# Patient Record
Sex: FEMALE | Race: OTHER | NOT HISPANIC OR LATINO | URBAN - METROPOLITAN AREA
[De-identification: names, ages, dates, MRNs, and addresses within clinical notes are randomized per-mention and may not be internally consistent; named-entity substitution may affect disease eponyms.]

---

## 2024-09-01 ENCOUNTER — INPATIENT (INPATIENT)
Facility: HOSPITAL | Age: 54
LOS: 4 days | Discharge: HOME | DRG: 418 | End: 2024-09-06
Attending: HOSPITALIST | Admitting: STUDENT IN AN ORGANIZED HEALTH CARE EDUCATION/TRAINING PROGRAM
Payer: COMMERCIAL

## 2024-09-01 VITALS
HEART RATE: 70 BPM | SYSTOLIC BLOOD PRESSURE: 123 MMHG | RESPIRATION RATE: 19 BRPM | TEMPERATURE: 98 F | DIASTOLIC BLOOD PRESSURE: 85 MMHG | OXYGEN SATURATION: 99 %

## 2024-09-01 DIAGNOSIS — K80.50 CALCULUS OF BILE DUCT WITHOUT CHOLANGITIS OR CHOLECYSTITIS WITHOUT OBSTRUCTION: ICD-10-CM

## 2024-09-01 LAB
ALBUMIN SERPL ELPH-MCNC: 4.1 G/DL — SIGNIFICANT CHANGE UP (ref 3.3–5)
ALBUMIN SERPL ELPH-MCNC: 4.6 G/DL — SIGNIFICANT CHANGE UP (ref 3.3–5)
ALP SERPL-CCNC: 159 U/L — HIGH (ref 40–120)
ALP SERPL-CCNC: 165 U/L — HIGH (ref 40–120)
ALT FLD-CCNC: 116 U/L — HIGH (ref 10–45)
ALT FLD-CCNC: 230 U/L — HIGH (ref 10–45)
ANION GAP SERPL CALC-SCNC: 15 MMOL/L — SIGNIFICANT CHANGE UP (ref 5–17)
ANION GAP SERPL CALC-SCNC: 15 MMOL/L — SIGNIFICANT CHANGE UP (ref 5–17)
ANISOCYTOSIS BLD QL: SIGNIFICANT CHANGE UP
APPEARANCE UR: CLEAR — SIGNIFICANT CHANGE UP
AST SERPL-CCNC: 238 U/L — HIGH (ref 10–40)
AST SERPL-CCNC: 435 U/L — HIGH (ref 10–40)
BACTERIA # UR AUTO: ABNORMAL /HPF
BASE EXCESS BLDV CALC-SCNC: -1.3 MMOL/L — SIGNIFICANT CHANGE UP (ref -2–3)
BASOPHILS # BLD AUTO: 0 K/UL — SIGNIFICANT CHANGE UP (ref 0–0.2)
BASOPHILS NFR BLD AUTO: 0 % — SIGNIFICANT CHANGE UP (ref 0–2)
BILIRUB SERPL-MCNC: 1.6 MG/DL — HIGH (ref 0.2–1.2)
BILIRUB SERPL-MCNC: 1.7 MG/DL — HIGH (ref 0.2–1.2)
BILIRUB UR-MCNC: NEGATIVE — SIGNIFICANT CHANGE UP
BUN SERPL-MCNC: 18 MG/DL — SIGNIFICANT CHANGE UP (ref 7–23)
BUN SERPL-MCNC: 20 MG/DL — SIGNIFICANT CHANGE UP (ref 7–23)
CALCIUM SERPL-MCNC: 10.1 MG/DL — SIGNIFICANT CHANGE UP (ref 8.4–10.5)
CALCIUM SERPL-MCNC: 9.6 MG/DL — SIGNIFICANT CHANGE UP (ref 8.4–10.5)
CAST: 4 /LPF — SIGNIFICANT CHANGE UP (ref 0–4)
CHLORIDE SERPL-SCNC: 100 MMOL/L — SIGNIFICANT CHANGE UP (ref 96–108)
CHLORIDE SERPL-SCNC: 102 MMOL/L — SIGNIFICANT CHANGE UP (ref 96–108)
CO2 BLDV-SCNC: 26 MMOL/L — SIGNIFICANT CHANGE UP (ref 22–26)
CO2 SERPL-SCNC: 17 MMOL/L — LOW (ref 22–31)
CO2 SERPL-SCNC: 22 MMOL/L — SIGNIFICANT CHANGE UP (ref 22–31)
COLOR SPEC: YELLOW — SIGNIFICANT CHANGE UP
CREAT SERPL-MCNC: 0.58 MG/DL — SIGNIFICANT CHANGE UP (ref 0.5–1.3)
CREAT SERPL-MCNC: 0.59 MG/DL — SIGNIFICANT CHANGE UP (ref 0.5–1.3)
DACRYOCYTES BLD QL SMEAR: SLIGHT — SIGNIFICANT CHANGE UP
DIFF PNL FLD: NEGATIVE — SIGNIFICANT CHANGE UP
EGFR: 108 ML/MIN/1.73M2 — SIGNIFICANT CHANGE UP
EGFR: 108 ML/MIN/1.73M2 — SIGNIFICANT CHANGE UP
EOSINOPHIL # BLD AUTO: 0.26 K/UL — SIGNIFICANT CHANGE UP (ref 0–0.5)
EOSINOPHIL NFR BLD AUTO: 1.8 % — SIGNIFICANT CHANGE UP (ref 0–6)
GAS PNL BLDV: SIGNIFICANT CHANGE UP
GAS PNL BLDV: SIGNIFICANT CHANGE UP
GLUCOSE SERPL-MCNC: 103 MG/DL — HIGH (ref 70–99)
GLUCOSE SERPL-MCNC: 114 MG/DL — HIGH (ref 70–99)
GLUCOSE UR QL: NEGATIVE MG/DL — SIGNIFICANT CHANGE UP
HCO3 BLDV-SCNC: 25 MMOL/L — SIGNIFICANT CHANGE UP (ref 22–29)
HCT VFR BLD CALC: 39.3 % — SIGNIFICANT CHANGE UP (ref 34.5–45)
HGB BLD-MCNC: 11.7 G/DL — SIGNIFICANT CHANGE UP (ref 11.5–15.5)
KETONES UR-MCNC: NEGATIVE MG/DL — SIGNIFICANT CHANGE UP
LEUKOCYTE ESTERASE UR-ACNC: ABNORMAL
LIDOCAIN IGE QN: 46 U/L — SIGNIFICANT CHANGE UP (ref 7–60)
LYMPHOCYTES # BLD AUTO: 1.16 K/UL — SIGNIFICANT CHANGE UP (ref 1–3.3)
LYMPHOCYTES # BLD AUTO: 7.9 % — LOW (ref 13–44)
MANUAL SMEAR VERIFICATION: SIGNIFICANT CHANGE UP
MCHC RBC-ENTMCNC: 18.8 PG — LOW (ref 27–34)
MCHC RBC-ENTMCNC: 29.8 GM/DL — LOW (ref 32–36)
MCV RBC AUTO: 63.3 FL — LOW (ref 80–100)
MICROCYTES BLD QL: SLIGHT — SIGNIFICANT CHANGE UP
MONOCYTES # BLD AUTO: 0.38 K/UL — SIGNIFICANT CHANGE UP (ref 0–0.9)
MONOCYTES NFR BLD AUTO: 2.6 % — SIGNIFICANT CHANGE UP (ref 2–14)
NEUTROPHILS # BLD AUTO: 12.85 K/UL — HIGH (ref 1.8–7.4)
NEUTROPHILS NFR BLD AUTO: 87.7 % — HIGH (ref 43–77)
NITRITE UR-MCNC: NEGATIVE — SIGNIFICANT CHANGE UP
OVALOCYTES BLD QL SMEAR: SLIGHT — SIGNIFICANT CHANGE UP
PCO2 BLDV: 46 MMHG — HIGH (ref 39–42)
PH BLDV: 7.34 — SIGNIFICANT CHANGE UP (ref 7.32–7.43)
PH UR: 5.5 — SIGNIFICANT CHANGE UP (ref 5–8)
PLAT MORPH BLD: NORMAL — SIGNIFICANT CHANGE UP
PLATELET # BLD AUTO: 295 K/UL — SIGNIFICANT CHANGE UP (ref 150–400)
PO2 BLDV: 30 MMHG — SIGNIFICANT CHANGE UP (ref 25–45)
POIKILOCYTOSIS BLD QL AUTO: SLIGHT — SIGNIFICANT CHANGE UP
POLYCHROMASIA BLD QL SMEAR: SLIGHT — SIGNIFICANT CHANGE UP
POTASSIUM SERPL-MCNC: 4.3 MMOL/L — SIGNIFICANT CHANGE UP (ref 3.5–5.3)
POTASSIUM SERPL-MCNC: 5.7 MMOL/L — HIGH (ref 3.5–5.3)
POTASSIUM SERPL-SCNC: 4.3 MMOL/L — SIGNIFICANT CHANGE UP (ref 3.5–5.3)
POTASSIUM SERPL-SCNC: 5.7 MMOL/L — HIGH (ref 3.5–5.3)
PROT SERPL-MCNC: 7.9 G/DL — SIGNIFICANT CHANGE UP (ref 6–8.3)
PROT SERPL-MCNC: 8.6 G/DL — HIGH (ref 6–8.3)
PROT UR-MCNC: NEGATIVE MG/DL — SIGNIFICANT CHANGE UP
RBC # BLD: 6.21 M/UL — HIGH (ref 3.8–5.2)
RBC # FLD: 17.3 % — HIGH (ref 10.3–14.5)
RBC BLD AUTO: ABNORMAL
RBC CASTS # UR COMP ASSIST: 1 /HPF — SIGNIFICANT CHANGE UP (ref 0–4)
SAO2 % BLDV: 47 % — LOW (ref 67–88)
SODIUM SERPL-SCNC: 132 MMOL/L — LOW (ref 135–145)
SODIUM SERPL-SCNC: 139 MMOL/L — SIGNIFICANT CHANGE UP (ref 135–145)
SP GR SPEC: 1.03 — SIGNIFICANT CHANGE UP (ref 1–1.03)
SQUAMOUS # UR AUTO: 8 /HPF — HIGH (ref 0–5)
TARGETS BLD QL SMEAR: SLIGHT — SIGNIFICANT CHANGE UP
TROPONIN T, HIGH SENSITIVITY RESULT: <6 NG/L — SIGNIFICANT CHANGE UP (ref 0–51)
UROBILINOGEN FLD QL: 1 MG/DL — SIGNIFICANT CHANGE UP (ref 0.2–1)
WBC # BLD: 14.65 K/UL — HIGH (ref 3.8–10.5)
WBC # FLD AUTO: 14.65 K/UL — HIGH (ref 3.8–10.5)
WBC UR QL: 12 /HPF — HIGH (ref 0–5)

## 2024-09-01 PROCEDURE — 99285 EMERGENCY DEPT VISIT HI MDM: CPT

## 2024-09-01 PROCEDURE — 74177 CT ABD & PELVIS W/CONTRAST: CPT | Mod: 26,MC

## 2024-09-01 PROCEDURE — 76705 ECHO EXAM OF ABDOMEN: CPT | Mod: 26

## 2024-09-01 RX ORDER — ONDANSETRON 2 MG/ML
8 INJECTION, SOLUTION INTRAMUSCULAR; INTRAVENOUS ONCE
Refills: 0 | Status: DISCONTINUED | OUTPATIENT
Start: 2024-09-01 | End: 2024-09-01

## 2024-09-01 RX ORDER — ONDANSETRON 2 MG/ML
4 INJECTION, SOLUTION INTRAMUSCULAR; INTRAVENOUS ONCE
Refills: 0 | Status: COMPLETED | OUTPATIENT
Start: 2024-09-01 | End: 2024-09-01

## 2024-09-01 RX ORDER — SODIUM CHLORIDE 9 MG/ML
1000 INJECTION INTRAMUSCULAR; INTRAVENOUS; SUBCUTANEOUS ONCE
Refills: 0 | Status: COMPLETED | OUTPATIENT
Start: 2024-09-01 | End: 2024-09-01

## 2024-09-01 RX ADMIN — SODIUM CHLORIDE 1000 MILLILITER(S): 9 INJECTION INTRAMUSCULAR; INTRAVENOUS; SUBCUTANEOUS at 21:30

## 2024-09-01 NOTE — ED PROVIDER NOTE - PHYSICAL EXAMINATION
Const: Awake, alert, no acute distress.  Well appearing.  Sitting comfortably on stretcher.  HEENT: NC/AT.  Moist mucous membranes.   Eyes: Conjunctiva clear. No scleral icterus.  Neck: Neck supple, ROM without pain.  Cardiac: Regular rate and regular rhythm. S1 S2 present.    Resp: Speaking in full sentences. No evidence of respiratory distress.  Good air entry b/l, breath sounds clear to auscultation b/l.  Abd: Non-distended, no overlying skin changes.  Soft, no guarding, no rigidity.  No palpable masses. + RUQ TTP, + epigastric TTP.   MSK: Spine midline and non-tender, no paraspinal tenderness, no palpable deformities. Evaluating R CVAT elicits RUQ abd pain. No L CVAT.   Skin: Normal coloration.  No rashes, abrasions or lacerations.  Neuro: Awake, alert & oriented x 3.  Moves all extremities spontaneously.

## 2024-09-01 NOTE — ED ADULT NURSE NOTE - OBJECTIVE STATEMENT
Pt is a 55 y/o F with PMH of breast CA presenting with 3 days of progressive BL U abdominal pain and diarrhea starting this morning. Pt endorses taking ibuprofen at 1700 today providing partial relief. Upon assessment pt is a/o x 3 speaking coherently in full sentences. Pt is breathing unlabored and equal BL in no apparent distress, radial pulses are 2+ BL, abdomen is soft, TTP, and nondistended, skin is warm and dry. Pt denies fevers/chills, headaches/vision changes, chest pain/SOB, n/v/d, or changes in urinary/defecation habits. Pt is in stretcher in lowest position with side rails up.

## 2024-09-01 NOTE — ED PROVIDER NOTE - OBJECTIVE STATEMENT
54-year-old female with PMHx of breast cancer dx 2018 s/p bilateral mastectomy presenting with 1 day of abdominal pain and diarrhea.  + nausea. Patient experienced abdominal pain a few days ago. resolved without intervention.  This morning patient woke up with diffuse abdominal pain, went to work after eating tea and eggs, did not eat again until 5 PM, pain became much worse after eating.  The only thing patient ate that her family did not eat yesterday was [employer], denies abdominal pain after that meal.  Patient had similar symptoms 4 months ago, also resolved without intervention.  Patient's last normal bowel movement was 2 days ago, has been passing gas. No hx of abd surgeries, still has gallbladder and appendix.  Hx of DVT that led to PE 4 months ago.  Denies fever, chest pain, shortness of breath, vomiting, constipation, dysuria, hematuria, hematochezia/melena. No one else experiencing similar symptoms at home. Pt returned from [place] 20 days ago, has been feeling ok since returning until recently.

## 2024-09-01 NOTE — ED PROVIDER NOTE - CLINICAL SUMMARY MEDICAL DECISION MAKING FREE TEXT BOX
Attending note.  Patient was seen in room #33 to the right.  Patient is was complaining some mild generalized abdominal pain that started 3 days ago and that resolved yesterday.  At 5 PM today when she was eating she had acute onset of epigastric and right upper quadrant pain with radiation to the back.  Patient received some ibuprofen and the pain is currently improved she had nausea and diaphoresis when pain was severe.  She denies any vomiting.  Patient had episodes sewed of diarrhea x 2 this morning which was nonbloody.  She is currently anorexic and has not eaten dinner.  She denies any respiratory or  symptoms.  She has no previous abdominal surgery.  She has a past medical history of breast cancer and was being treated with tamoxifen which resulted in a pulmonary embolus.  Social history is negative.  She has no allergies to medications.      ROS-as above, otherwise negative.  PE-patient is alert no acute distress.  There is no pallor or jaundice.  She has moist mucous membranes.  Lungs are clear and equal bilaterally.  Heart is regular rate and rhythm.  Abdomen is soft, nondistended.  There is tenderness in the epigastrium greater than the right upper quadrant.  With percussion to the right flank patient experiences pain in the right upper quadrant.  There is no guarding or rebound.  There is no extremity edema.  Neurologic examination is grossly intact.        A/P-patient with acute onset of epigastric and right upper quadrant pain with pain referred to the right upper quadrant when percussing in the flank.  Concern for biliary colic.  Differential also includes pancreatitis versus ulcer versus colitis.  Labs including lipase, hCG, urinalysis, right upper quadrant ultrasound, CT scan abdomen pelvis to rule out colitis.  IV hydration, Zofran.

## 2024-09-01 NOTE — ED PROVIDER NOTE - NSFOLLOWUPINSTRUCTIONS_ED_ALL_ED_FT
You were seen in the ED today for abdominal pain.  You had blood work, and ultrasound, and a CT scan done here today.  The results of these tests are attached within this packet, please bring them with you when you follow-up with your primary care physician in 2-3 days.    Many things can cause abdominal pain. Many times, abdominal pain is not caused by a disease and will improve without treatment. Your health care provider will do a physical exam to determine if there is a dangerous cause of your pain; blood tests and imaging may help determine the cause of your pain. However, in many cases, no cause may be found and you may need further testing as an outpatient. Monitor your abdominal pain for any changes.     SEEK IMMEDIATE MEDICAL CARE IF YOU HAVE ANY OF THE FOLLOWING SYMPTOMS: worsening abdominal pain, uncontrollable vomiting, profuse diarrhea, inability to have bowel movements or pass gas, black or bloody stools, fever accompanying chest pain or back pain, or fainting. These symptoms may represent a serious problem that is an emergency. Do not wait to see if the symptoms will go away. Get medical help right away. Call 911 and do not drive yourself to the hospital.

## 2024-09-01 NOTE — ED PROVIDER NOTE - PROGRESS NOTE DETAILS
US showing dilate CBD and elevated LFTs without fever.  choledocholithiasis - admit to medicine for MRCP/ERCP.

## 2024-09-02 DIAGNOSIS — Z85.3 PERSONAL HISTORY OF MALIGNANT NEOPLASM OF BREAST: ICD-10-CM

## 2024-09-02 DIAGNOSIS — K83.09 OTHER CHOLANGITIS: ICD-10-CM

## 2024-09-02 DIAGNOSIS — Z29.9 ENCOUNTER FOR PROPHYLACTIC MEASURES, UNSPECIFIED: ICD-10-CM

## 2024-09-02 DIAGNOSIS — Z90.10 ACQUIRED ABSENCE OF UNSPECIFIED BREAST AND NIPPLE: Chronic | ICD-10-CM

## 2024-09-02 DIAGNOSIS — Z90.11 ACQUIRED ABSENCE OF RIGHT BREAST AND NIPPLE: Chronic | ICD-10-CM

## 2024-09-02 PROCEDURE — 99222 1ST HOSP IP/OBS MODERATE 55: CPT | Mod: GC

## 2024-09-02 PROCEDURE — 99223 1ST HOSP IP/OBS HIGH 75: CPT | Mod: GC

## 2024-09-02 PROCEDURE — 74181 MRI ABDOMEN W/O CONTRAST: CPT | Mod: 26

## 2024-09-02 RX ORDER — PIPERACILLIN SODIUM AND TAZOBACTAM SODIUM 3; .375 G/15ML; G/15ML
3.38 INJECTION, POWDER, FOR SOLUTION INTRAVENOUS EVERY 8 HOURS
Refills: 0 | Status: DISCONTINUED | OUTPATIENT
Start: 2024-09-02 | End: 2024-09-05

## 2024-09-02 RX ORDER — IBUPROFEN 600 MG
400 TABLET ORAL ONCE
Refills: 0 | Status: COMPLETED | OUTPATIENT
Start: 2024-09-02 | End: 2024-09-02

## 2024-09-02 RX ORDER — EXEMESTANE 25 MG/1
25 TABLET, FILM COATED ORAL
Refills: 0 | Status: DISCONTINUED | OUTPATIENT
Start: 2024-09-02 | End: 2024-09-05

## 2024-09-02 RX ORDER — PIPERACILLIN SODIUM AND TAZOBACTAM SODIUM 3; .375 G/15ML; G/15ML
3.38 INJECTION, POWDER, FOR SOLUTION INTRAVENOUS ONCE
Refills: 0 | Status: COMPLETED | OUTPATIENT
Start: 2024-09-02 | End: 2024-09-02

## 2024-09-02 RX ORDER — MAGNESIUM, ALUMINUM HYDROXIDE 200-225/5
30 SUSPENSION, ORAL (FINAL DOSE FORM) ORAL EVERY 4 HOURS
Refills: 0 | Status: DISCONTINUED | OUTPATIENT
Start: 2024-09-02 | End: 2024-09-05

## 2024-09-02 RX ORDER — ONDANSETRON 2 MG/ML
4 INJECTION, SOLUTION INTRAMUSCULAR; INTRAVENOUS EVERY 8 HOURS
Refills: 0 | Status: DISCONTINUED | OUTPATIENT
Start: 2024-09-02 | End: 2024-09-05

## 2024-09-02 RX ORDER — EXEMESTANE 25 MG/1
0 TABLET, FILM COATED ORAL
Qty: 0 | Refills: 0 | DISCHARGE

## 2024-09-02 RX ADMIN — PIPERACILLIN SODIUM AND TAZOBACTAM SODIUM 25 GRAM(S): 3; .375 INJECTION, POWDER, FOR SOLUTION INTRAVENOUS at 21:59

## 2024-09-02 RX ADMIN — Medication 100 MILLILITER(S): at 06:00

## 2024-09-02 RX ADMIN — EXEMESTANE 25 MILLIGRAM(S): 25 TABLET, FILM COATED ORAL at 13:44

## 2024-09-02 RX ADMIN — PIPERACILLIN SODIUM AND TAZOBACTAM SODIUM 200 GRAM(S): 3; .375 INJECTION, POWDER, FOR SOLUTION INTRAVENOUS at 03:14

## 2024-09-02 RX ADMIN — PIPERACILLIN SODIUM AND TAZOBACTAM SODIUM 25 GRAM(S): 3; .375 INJECTION, POWDER, FOR SOLUTION INTRAVENOUS at 06:00

## 2024-09-02 RX ADMIN — Medication 400 MILLIGRAM(S): at 20:26

## 2024-09-02 RX ADMIN — Medication 400 MILLIGRAM(S): at 21:30

## 2024-09-02 RX ADMIN — PIPERACILLIN SODIUM AND TAZOBACTAM SODIUM 25 GRAM(S): 3; .375 INJECTION, POWDER, FOR SOLUTION INTRAVENOUS at 13:44

## 2024-09-02 NOTE — H&P ADULT - NSHPLABSRESULTS_GEN_ALL_CORE
11.7   14.65 )-----------( 295      ( 01 Sep 2024 20:21 )             39.3       09-01    132<L>  |  100  |  18  ----------------------------<  103<H>  4.3   |  17<L>  |  0.59    Ca    9.6      01 Sep 2024 21:29    TPro  7.9  /  Alb  4.1  /  TBili  1.7<H>  /  DBili  x   /  AST  435<H>  /  ALT  230<H>  /  AlkPhos  165<H>  09-01              Urinalysis Basic - ( 01 Sep 2024 21:29 )    Color: x / Appearance: x / SG: x / pH: x  Gluc: 103 mg/dL / Ketone: x  / Bili: x / Urobili: x   Blood: x / Protein: x / Nitrite: x   Leuk Esterase: x / RBC: x / WBC x   Sq Epi: x / Non Sq Epi: x / Bacteria: x                  CAPILLARY BLOOD GLUCOSE

## 2024-09-02 NOTE — H&P ADULT - NSHPPHYSICALEXAM_GEN_ALL_CORE
Vital Signs Last 24 Hrs  T(C): 36.6 (02 Sep 2024 00:13), Max: 36.7 (01 Sep 2024 22:56)  T(F): 97.9 (02 Sep 2024 00:13), Max: 98 (01 Sep 2024 22:56)  HR: 53 (02 Sep 2024 00:13) (53 - 70)  BP: 124/80 (02 Sep 2024 00:13) (113/75 - 130/84)  BP(mean): --  RR: 18 (02 Sep 2024 00:13) (18 - 20)  SpO2: 97% (02 Sep 2024 00:13) (97% - 100%)    Parameters below as of 02 Sep 2024 00:13  Patient On (Oxygen Delivery Method): room air        CONSTITUTIONAL: Well-groomed, in no apparent distress  EYES: No conjunctival or scleral injection, non-icteric  ENMT: No external nasal lesions; MMM  RESPIRATORY: Breathing comfortably; lungs CTA without wheeze/rhonchi/rales  CARDIOVASCULAR: +S1S2, RRR; no lower extremity edema  GASTROINTESTINAL: soft, TTP RUQ and epigastric, +BS throughout, no rebound/guarding  NEUROLOGIC: No gross focal neurological deficits, AAOX3  PSYCHIATRIC: mood and affect appropriate; appropriate insight and judgment

## 2024-09-02 NOTE — CHART NOTE - NSCHARTNOTEFT_GEN_A_CORE
H&P reviewed. On exam, pt's abdomen is soft, nontender, nondistended. Pain has improved since admission. GI recs appreciated.    - MRCP to r/o choledocholithiasis  - c/w zosyn for mild cholangitis  - daily CBC/CMP/INR

## 2024-09-02 NOTE — H&P ADULT - PROBLEM SELECTOR PLAN 1
-start zosyn  -GI consult emailed   -NPO except meds for now  -ordered for 1L LR @ 100cc/hr. Monitor resp status and re-order as needed.

## 2024-09-02 NOTE — CONSULT NOTE ADULT - ATTENDING COMMENTS
History/PE as above.  Patient seen/examined.  Assessment and recommendations as noted–admitted with episode of abdominal pain with clinical course noted for elevated liver enzymes and abnormal sonogram noted for gallstones and question as to choledocholithiasis.  Reports no current fever, nausea or vomiting.  Pain somewhat subsided.  Currently comfortable/NAD/nontoxic appearing.  Status post MRCP earlier today–await results.  Pending MRCP if choledocholithiasis confirmed then plan for ERCP.  Continue to monitor serial liver enzymes and in the event of any further increase in WBC or fevers obtain blood cultures.  Surgery consult regarding timing of laparoscopic cholecystectomy

## 2024-09-02 NOTE — H&P ADULT - HISTORY OF PRESENT ILLNESS
54F PMHx h/o breast ca s/p b/l mastectomy, recent DVT/PE. Presenting w/ abd pain, nausea and diarrhea x1d, worse with eating.   In the ED, VSS.   CBC w/ wbc 14, hgb 11.7, plt 295.   First CMP showed K 5.7, SCr 0.58, Tbili 1.6, alk phos 159, , , lipase 46.   Pt received 1L IVF.   Repeat CMP showed K 4.3, SCr 0.59, Tbili 1.7, alk phos 165, , .   VBG w/ lactate 2.0   UA with trace LE, 12 wbc, mod bacteria, but contaminated w/ 8 epith cells.   CT A/p neg for acute path, but US ruq showed gallstones and  8mm dilated CBD, but no GB wall thickening.  54F PMHx h/o breast ca s/p b/l mastectomy, recent DVT/PE. Presenting w/ abd pain, nausea and diarrhea x1d, worse with eating.   In the ED, VSS.   CBC w/ wbc 14, hgb 11.7, plt 295.   First CMP showed K 5.7, SCr 0.58, Tbili 1.6, alk phos 159, , , lipase 46.   Pt received 1L IVF.   Repeat CMP showed K 4.3, SCr 0.59, Tbili 1.7, alk phos 165, , .   VBG w/ lactate 2.0   UA with trace LE, 12 wbc, mod bacteria, but contaminated w/ 8 epith cells.   CT A/p neg for acute path, but US ruq showed gallstones and  8mm dilated CBD, but no GB wall thickening.   Pt meets criteria for cholangitis

## 2024-09-02 NOTE — CONSULT NOTE ADULT - SUBJECTIVE AND OBJECTIVE BOX
Initial GI Consult    HPI:  54F PMHx h/o breast ca s/p b/l mastectomy, recent DVT/PE who presents with abdominal pain.     PAST MEDICAL & SURGICAL HISTORY:  Breast cancer      H/O mastectomy        FAMILY HISTORY:      MEDS:  MEDICATIONS  (STANDING):  exemestane 25 milliGRAM(s) Oral <User Schedule>  piperacillin/tazobactam IVPB.. 3.375 Gram(s) IV Intermittent every 8 hours    MEDICATIONS  (PRN):  aluminum hydroxide/magnesium hydroxide/simethicone Suspension 30 milliLiter(s) Oral every 4 hours PRN Dyspepsia  melatonin 3 milliGRAM(s) Oral at bedtime PRN Insomnia  ondansetron Injectable 4 milliGRAM(s) IV Push every 8 hours PRN Nausea and/or Vomiting    Allergies    No Known Allergies    Intolerances          ROS: As per HPI  ______________________________________________________________________  PHYSICAL EXAM:  T(C): 36.4 (09-02-24 @ 05:14), Max: 36.7 (09-01-24 @ 22:56)  HR: 68 (09-02-24 @ 05:14)  BP: 125/83 (09-02-24 @ 05:14)  RR: 18 (09-02-24 @ 05:14)  SpO2: 95% (09-02-24 @ 05:14)  Wt(kg): --      GEN: NAD, normocephalic  CVS: S1S2+  CHEST: clear to auscultation  ABD: soft , nontender, nondistended, bowel sounds present  EXTR: no cyanosis, no clubbing, no edema  NEURO: A&OX3  SKIN:  warm;  non icteric    ______________________________________________________________________  LABS:                        11.7   14.65 )-----------( 295      ( 01 Sep 2024 20:21 )             39.3     09-01    132<L>  |  100  |  18  ----------------------------<  103<H>  4.3   |  17<L>  |  0.59    Ca    9.6      01 Sep 2024 21:29    TPro  7.9  /  Alb  4.1  /  TBili  1.7<H>  /  DBili  x   /  AST  435<H>  /  ALT  230<H>  /  AlkPhos  165<H>  09-01    LIVER FUNCTIONS - ( 01 Sep 2024 21:29 )  Alb: 4.1 g/dL / Pro: 7.9 g/dL / ALK PHOS: 165 U/L / ALT: 230 U/L / AST: 435 U/L / GGT: x             ____________________________________________         Initial GI Consult    HPI:  54F PMHx h/o breast ca s/p b/l mastectomy, recent DVT/PE who presents with abdominal pain. Patient reports that she passed out yesterday from abdominal pain. She felt the pain in her entire abdomen. She denies diarrhea, constipation, N/V. No alcohol use/ drug use. She has never been told that she has liver issues. No DM or HTN. No new meds or herbal supplements. No blood thinners.     PAST MEDICAL & SURGICAL HISTORY:  Breast cancer      H/O mastectomy        FAMILY HISTORY:      MEDS:  MEDICATIONS  (STANDING):  exemestane 25 milliGRAM(s) Oral <User Schedule>  piperacillin/tazobactam IVPB.. 3.375 Gram(s) IV Intermittent every 8 hours    MEDICATIONS  (PRN):  aluminum hydroxide/magnesium hydroxide/simethicone Suspension 30 milliLiter(s) Oral every 4 hours PRN Dyspepsia  melatonin 3 milliGRAM(s) Oral at bedtime PRN Insomnia  ondansetron Injectable 4 milliGRAM(s) IV Push every 8 hours PRN Nausea and/or Vomiting    Allergies    No Known Allergies    Intolerances          ROS: As per HPI  ______________________________________________________________________  PHYSICAL EXAM:  T(C): 36.4 (09-02-24 @ 05:14), Max: 36.7 (09-01-24 @ 22:56)  HR: 68 (09-02-24 @ 05:14)  BP: 125/83 (09-02-24 @ 05:14)  RR: 18 (09-02-24 @ 05:14)  SpO2: 95% (09-02-24 @ 05:14)  Wt(kg): --      GEN: NAD, normocephalic  CVS: S1S2+  CHEST: clear to auscultation  ABD: soft , nontender, nondistended, bowel sounds present  EXTR: no cyanosis, no clubbing, no edema  NEURO: A&OX3  SKIN:  warm;  non icteric    ______________________________________________________________________  LABS:                        11.7   14.65 )-----------( 295      ( 01 Sep 2024 20:21 )             39.3     09-01    132<L>  |  100  |  18  ----------------------------<  103<H>  4.3   |  17<L>  |  0.59    Ca    9.6      01 Sep 2024 21:29    TPro  7.9  /  Alb  4.1  /  TBili  1.7<H>  /  DBili  x   /  AST  435<H>  /  ALT  230<H>  /  AlkPhos  165<H>  09-01    LIVER FUNCTIONS - ( 01 Sep 2024 21:29 )  Alb: 4.1 g/dL / Pro: 7.9 g/dL / ALK PHOS: 165 U/L / ALT: 230 U/L / AST: 435 U/L / GGT: x             ____________________________________________

## 2024-09-03 DIAGNOSIS — K80.20 CALCULUS OF GALLBLADDER WITHOUT CHOLECYSTITIS WITHOUT OBSTRUCTION: ICD-10-CM

## 2024-09-03 LAB
ALBUMIN SERPL ELPH-MCNC: 4.3 G/DL — SIGNIFICANT CHANGE UP (ref 3.3–5)
ALP SERPL-CCNC: 175 U/L — HIGH (ref 40–120)
ALT FLD-CCNC: 364 U/L — HIGH (ref 10–45)
ANION GAP SERPL CALC-SCNC: 12 MMOL/L — SIGNIFICANT CHANGE UP (ref 5–17)
APTT BLD: 31.3 SEC — SIGNIFICANT CHANGE UP (ref 24.5–35.6)
AST SERPL-CCNC: 181 U/L — HIGH (ref 10–40)
BILIRUB SERPL-MCNC: 2.5 MG/DL — HIGH (ref 0.2–1.2)
BLD GP AB SCN SERPL QL: NEGATIVE — SIGNIFICANT CHANGE UP
BUN SERPL-MCNC: 13 MG/DL — SIGNIFICANT CHANGE UP (ref 7–23)
CALCIUM SERPL-MCNC: 9.8 MG/DL — SIGNIFICANT CHANGE UP (ref 8.4–10.5)
CHLORIDE SERPL-SCNC: 102 MMOL/L — SIGNIFICANT CHANGE UP (ref 96–108)
CO2 SERPL-SCNC: 26 MMOL/L — SIGNIFICANT CHANGE UP (ref 22–31)
CREAT SERPL-MCNC: 0.77 MG/DL — SIGNIFICANT CHANGE UP (ref 0.5–1.3)
CULTURE RESULTS: SIGNIFICANT CHANGE UP
EGFR: 92 ML/MIN/1.73M2 — SIGNIFICANT CHANGE UP
GLUCOSE SERPL-MCNC: 99 MG/DL — SIGNIFICANT CHANGE UP (ref 70–99)
HCT VFR BLD CALC: 39.3 % — SIGNIFICANT CHANGE UP (ref 34.5–45)
HGB BLD-MCNC: 12.1 G/DL — SIGNIFICANT CHANGE UP (ref 11.5–15.5)
INR BLD: 1.01 RATIO — SIGNIFICANT CHANGE UP (ref 0.85–1.18)
MAGNESIUM SERPL-MCNC: 2.3 MG/DL — SIGNIFICANT CHANGE UP (ref 1.6–2.6)
MCHC RBC-ENTMCNC: 19 PG — LOW (ref 27–34)
MCHC RBC-ENTMCNC: 30.8 GM/DL — LOW (ref 32–36)
MCV RBC AUTO: 61.7 FL — LOW (ref 80–100)
NRBC # BLD: 0 /100 WBCS — SIGNIFICANT CHANGE UP (ref 0–0)
PHOSPHATE SERPL-MCNC: 4.6 MG/DL — HIGH (ref 2.5–4.5)
PLATELET # BLD AUTO: 295 K/UL — SIGNIFICANT CHANGE UP (ref 150–400)
POTASSIUM SERPL-MCNC: 4.1 MMOL/L — SIGNIFICANT CHANGE UP (ref 3.5–5.3)
POTASSIUM SERPL-SCNC: 4.1 MMOL/L — SIGNIFICANT CHANGE UP (ref 3.5–5.3)
PROT SERPL-MCNC: 7.9 G/DL — SIGNIFICANT CHANGE UP (ref 6–8.3)
PROTHROM AB SERPL-ACNC: 11.1 SEC — SIGNIFICANT CHANGE UP (ref 9.5–13)
RBC # BLD: 6.37 M/UL — HIGH (ref 3.8–5.2)
RBC # FLD: 17.3 % — HIGH (ref 10.3–14.5)
RH IG SCN BLD-IMP: POSITIVE — SIGNIFICANT CHANGE UP
SODIUM SERPL-SCNC: 140 MMOL/L — SIGNIFICANT CHANGE UP (ref 135–145)
SPECIMEN SOURCE: SIGNIFICANT CHANGE UP
WBC # BLD: 6.79 K/UL — SIGNIFICANT CHANGE UP (ref 3.8–10.5)
WBC # FLD AUTO: 6.79 K/UL — SIGNIFICANT CHANGE UP (ref 3.8–10.5)

## 2024-09-03 PROCEDURE — 99233 SBSQ HOSP IP/OBS HIGH 50: CPT

## 2024-09-03 RX ORDER — ACETAMINOPHEN 325 MG/1
650 TABLET ORAL EVERY 6 HOURS
Refills: 0 | Status: DISCONTINUED | OUTPATIENT
Start: 2024-09-03 | End: 2024-09-04

## 2024-09-03 RX ADMIN — PIPERACILLIN SODIUM AND TAZOBACTAM SODIUM 25 GRAM(S): 3; .375 INJECTION, POWDER, FOR SOLUTION INTRAVENOUS at 14:01

## 2024-09-03 RX ADMIN — PIPERACILLIN SODIUM AND TAZOBACTAM SODIUM 25 GRAM(S): 3; .375 INJECTION, POWDER, FOR SOLUTION INTRAVENOUS at 21:23

## 2024-09-03 RX ADMIN — PIPERACILLIN SODIUM AND TAZOBACTAM SODIUM 25 GRAM(S): 3; .375 INJECTION, POWDER, FOR SOLUTION INTRAVENOUS at 05:18

## 2024-09-03 NOTE — PROGRESS NOTE ADULT - ASSESSMENT
54F with PMH of breast cancer s/p b/l mastectomy and chemotherapy (2019), currently on Exemestane, DVT/PE (stopped Eliquis 4 months ago) who initially presented with three days of abdominal pain prior to initial presentation. Initial concern for choledolithiasis/cholangitis 2/2 to imaging findings of dilated CBD 8mm and gallstones, however MRCP negative for choledocho, found to have gallstones

## 2024-09-03 NOTE — CHART NOTE - NSCHARTNOTEFT_GEN_A_CORE
54F PMHx h/o breast ca s/p b/l mastectomy, recent DVT/PE who presents with abdominal pain.     Patient with MRCP showing no biliary dilatation, no choledocholithiasis, mildly elevated bili to 1.6-1.7 (below 4-5), elevated AST/ALT in a 2:1 fashion (with ALT <300), likely related to cholecystitis. No further recommendations from advanced gastroenterology at this time. May followup outpatient for abd pain if recurrent.     D/W Dr. Jose Rafael Reyna MD, PGY6  GI fellow 54F PMHx h/o breast ca s/p b/l mastectomy, recent DVT/PE who presents with abdominal pain.     Patient with MRCP showing no biliary dilatation, no choledocholithiasis, mildly elevated bili to 1.6-1.7 (below 4-5), elevated AST/ALT in a 2:1 fashion (with ALT <300), likely related to cholecystitis vs hepatology etiology. No further recommendations from advanced gastroenterology at this time. May followup outpatient with gastroenterology for abd pain if recurrent.     D/W Dr. Jose Rafael Reyna MD, PGY6  GI fellow 54F PMHx h/o breast ca s/p b/l mastectomy, recent DVT/PE who presents with abdominal pain.     Patient with MRCP showing no biliary dilatation, no choledocholithiasis, mildly elevated bili to 1.6-1.7 (below 4-5), elevated AST/ALT in a 2:1 fashion (with ALT <300), likely related to cholecystitis vs hepatology etiology. No further recommendations from advanced gastroenterology at this time. May followup outpatient with gastroenterology for abd pain if recurrent. Consider hepatology consult    D/W Dr. Jose Rafael Reyna MD, PGY6  GI fellow

## 2024-09-03 NOTE — CONSULT NOTE ADULT - ASSESSMENT
54F PMHx h/o breast ca s/p b/l mastectomy, recent DVT/PE who presents with abdominal pain.     #C/f choledocholithiasis   #C/f cholangitis   Patient with epigastric pain, mildly elevated bili to 1.6-1.7 (below 4-5), elevated AST/ALT in a 2:1 fashion (with ALT <300); CT WNL but with RUQ US showing CBD dilation to 8mm (no hx of cholecystectomy), + gallstones in GB. Also with leukocytosis, no fevers; [place] with possible mild acute cholangitis: Lipase 46     #Hepatic steatosis     Recommendations:   - Please get MRCP   - Continue to trend CMP, CBC and INR   - Hold AC if not contraindicated; If needs to be on AC then would place on heparin drip without initial bolus   - Abx per primary team   - Will need outpatient hepatology follow up for hepatic steatosis     Vanita Palafox MD  Gastroenterology/Hepatology Fellow, PGY-5  Please contact via TEAMS    NON-URGENT CONSULTS:  Please email [email or web address]  [email or web address]  
54F with PMH of breast cancer s/p b/l mastectomy and chemotherapy (2019), currently on Exemestane, DVT/PE (stopped Eliquis 4 months ago) who initially presented with three days of abdominal pain prior to initial presentation. Initial concern for choledolithiasis/cholangitis 2/2 to imaging findings of dilated CBD 8mm and gallstones, however subsequent MRI negative.    Surgery consulted for possible lap hussein.    RECS:  - Tbili elevated today to 2.5 from 1.7  - Please trend LFTs daily  - Will consider semi-elective cholecystectomy when Tbili normalizes  - Remainder of care per primary    Discussed with Dr. Mcmanus    Trauma -[id number]

## 2024-09-03 NOTE — CONSULT NOTE ADULT - SUBJECTIVE AND OBJECTIVE BOX
GENERAL SURGERY CONSULT NOTE  --------------------------------------------------------------------------------------------  HPI:    54F with PMH of breast cancer s/p b/l mastectomy and chemotherapy (2019), DVT/PE (stopped Eliquis 4 months ago) who initially presented with       ***      PAST MEDICAL & SURGICAL HISTORY:  Breast cancer      H/O mastectomy        FAMILY HISTORY:  [] Family history not pertinent as reviewed with the patient and family    SOCIAL HISTORY:  ***    ALLERGIES: No Known Allergies      HOME MEDICATIONS: ***    CURRENT MEDICATIONS  MEDICATIONS (STANDING): exemestane 25 milliGRAM(s) Oral <User Schedule>  piperacillin/tazobactam IVPB.. 3.375 Gram(s) IV Intermittent every 8 hours    MEDICATIONS (PRN):aluminum hydroxide/magnesium hydroxide/simethicone Suspension 30 milliLiter(s) Oral every 4 hours PRN Dyspepsia  melatonin 3 milliGRAM(s) Oral at bedtime PRN Insomnia  ondansetron Injectable 4 milliGRAM(s) IV Push every 8 hours PRN Nausea and/or Vomiting    --------------------------------------------------------------------------------------------    Vitals:   T(C): 36.7 (09-03-24 @ 05:17), Max: 36.7 (09-03-24 @ 05:17)  HR: 56 (09-03-24 @ 05:17) (56 - 93)  BP: 100/65 (09-03-24 @ 05:17) (100/65 - 122/82)  RR: 18 (09-03-24 @ 05:17) (18 - 18)  SpO2: 95% (09-03-24 @ 05:17) (95% - 98%)  CAPILLARY BLOOD GLUCOSE          09-02 @ 07:01  -  09-03 @ 07:00  --------------------------------------------------------  IN:    IV PiggyBack: 200 mL    Oral Fluid: 250 mL  Total IN: 450 mL    OUT:  Total OUT: 0 mL    Total NET: 450 mL        Height (cm): 160 (09-02 @ 04:00)  Weight (kg): 72.7 (09-02 @ 04:00)  BMI (kg/m2): 28.4 (09-02 @ 04:00)  BSA (m2): 1.76 (09-02 @ 04:00)      PHYSICAL EXAM: ***  General: NAD, Lying in bed comfortably  Neuro: Alert and answering questions appropriately   HEENT: Grossly normal, EOMI  Cardio: No peripherial edema  Resp: Good effort, no signs of respiratory distress  GI/Abd: Soft, NT/ND, no rebound/guarding, no masses palpated  Vascular: All 4 extremities warm  Musculoskeletal: All 4 extremities moving spontaneously, no limitations  --------------------------------------------------------------------------------------------    LABS  CBC (09-03 @ 09:41)                              12.1                           6.79    )----------------(  295        --    % Neutrophils, --    % Lymphocytes, ANC: --                                  39.3      BMP (09-03 @ 09:41)             140     |  102     |  13    		Ca++ --      Ca 9.8                ---------------------------------( 99    		Mg 2.3                4.1     |  26      |  0.77  			Ph 4.6<H>    LFTs (09-03 @ 09:41)      TPro 7.9 / Alb 4.3 / TBili 2.5<H> / DBili -- / <H> / <H> / AlkPhos 175<H>    Lisseth (09-03 @ 09:41)  aPTT 31.3 / INR 1.01 / PT 11.1          --------------------------------------------------------------------------------------------    MICROBIOLOGY  Urinalysis (09-03 @ 09:41):     Color:  / Appearance:  / SG:  / pH:  / Gluc: 99 / Ketones:  / Bili:  / Urobili:  / Protein : / Nitrites:  / Leuk.Est:  / RBC:  / WBC:  / Sq Epi:  / Non Sq Epi:  / Bacteria          --------------------------------------------------------------------------------------------    IMAGING  ***    --------------------------------------------------------------------------------------------    ASSESSMENT: Patient is a 54yf pmhx ***    PLAN:  ***  -   -   -   -   - Patient seen/examined with attending.  - Plan to be discussed with Attending,   GENERAL SURGERY CONSULT NOTE  --------------------------------------------------------------------------------------------  HPI:    54F with PMH of breast cancer s/p b/l mastectomy and chemotherapy (2019), currently on Exemestane, DVT/PE (stopped Eliquis 4 months ago) who initially presented with three days of abdominal pain prior to initial presentation. Reports it came on without inciting factors, was diffuse, not incited by eating but had subsided on its own, however abdominal pain returned three days later. Denies fever, chills. Had one episode of diarrhea.     In ED, patient was afebrile, hemodynamically wnl. Labs significant for leukocytosis to 14, elevated transaminases and Tbili to 1.6. CTAP without acute pathology, RUQ with stones and dilated CBD to 8mm but no wall thickening or pericholecystic fluid. GI consulted, concern for choledocholithiasis/cholangitis, MRCP obtained however negative for biliary ductal dilation and choledocholithiasis. Since then patient reports abdominal pain has resolved.      ***      PAST MEDICAL & SURGICAL HISTORY:  Breast cancer      H/O mastectomy        FAMILY HISTORY:  [] Family history not pertinent as reviewed with the patient and family    SOCIAL HISTORY:  ***    ALLERGIES: No Known Allergies      HOME MEDICATIONS: ***    CURRENT MEDICATIONS  MEDICATIONS (STANDING): exemestane 25 milliGRAM(s) Oral <User Schedule>  piperacillin/tazobactam IVPB.. 3.375 Gram(s) IV Intermittent every 8 hours    MEDICATIONS (PRN):aluminum hydroxide/magnesium hydroxide/simethicone Suspension 30 milliLiter(s) Oral every 4 hours PRN Dyspepsia  melatonin 3 milliGRAM(s) Oral at bedtime PRN Insomnia  ondansetron Injectable 4 milliGRAM(s) IV Push every 8 hours PRN Nausea and/or Vomiting    --------------------------------------------------------------------------------------------    Vitals:   T(C): 36.7 (09-03-24 @ 05:17), Max: 36.7 (09-03-24 @ 05:17)  HR: 56 (09-03-24 @ 05:17) (56 - 93)  BP: 100/65 (09-03-24 @ 05:17) (100/65 - 122/82)  RR: 18 (09-03-24 @ 05:17) (18 - 18)  SpO2: 95% (09-03-24 @ 05:17) (95% - 98%)  CAPILLARY BLOOD GLUCOSE          09-02 @ 07:01  -  09-03 @ 07:00  --------------------------------------------------------  IN:    IV PiggyBack: 200 mL    Oral Fluid: 250 mL  Total IN: 450 mL    OUT:  Total OUT: 0 mL    Total NET: 450 mL        Height (cm): 160 (09-02 @ 04:00)  Weight (kg): 72.7 (09-02 @ 04:00)  BMI (kg/m2): 28.4 (09-02 @ 04:00)  BSA (m2): 1.76 (09-02 @ 04:00)      PHYSICAL EXAM:  General: NAD, Lying in bed comfortably  Neuro: Alert and answering questions appropriately   HEENT: Grossly normal  Resp: Good effort, no signs of respiratory distress  GI/Abd: Soft, NT/ND, no rebound/guarding, no masses palpated, negative Mckoy sign  Vascular: All 4 extremities warm  Musculoskeletal: All 4 extremities moving spontaneously, no limitations  --------------------------------------------------------------------------------------------    LABS  CBC (09-03 @ 09:41)                              12.1                           6.79    )----------------(  295        --    % Neutrophils, --    % Lymphocytes, ANC: --                                  39.3      BMP (09-03 @ 09:41)             140     |  102     |  13    		Ca++ --      Ca 9.8                ---------------------------------( 99    		Mg 2.3                4.1     |  26      |  0.77  			Ph 4.6<H>    LFTs (09-03 @ 09:41)      TPro 7.9 / Alb 4.3 / TBili 2.5<H> / DBili -- / <H> / <H> / AlkPhos 175<H>    Coags (09-03 @ 09:41)  aPTT 31.3 / INR 1.01 / PT 11.1          --------------------------------------------------------------------------------------------    MICROBIOLOGY  Urinalysis (09-03 @ 09:41):     Color:  / Appearance:  / SG:  / pH:  / Gluc: 99 / Ketones:  / Bili:  / Urobili:  / Protein : / Nitrites:  / Leuk.Est:  / RBC:  / WBC:  / Sq Epi:  / Non Sq Epi:  / Bacteria          --------------------------------------------------------------------------------------------    IMAGING  < from: US Abdomen Upper Quadrant Right (09.01.24 @ 22:05) >  IMPRESSION:  Small gravel-like gallstones. No wall thickening. No pericholecystic   fluid. No focal tenderness, however evaluation is limited by pain   medication.    Mildly dilated common bile duct measuring 8 mm. If further evaluation is   needed MRCP can be obtained.    Mild Hepatic steatosis.        --- End of Report ---    < end of copied text >      ---------------------------------------------------------------------------------------------

## 2024-09-04 ENCOUNTER — TRANSCRIPTION ENCOUNTER (OUTPATIENT)
Age: 54
End: 2024-09-04

## 2024-09-04 LAB
ALBUMIN SERPL ELPH-MCNC: 4.4 G/DL — SIGNIFICANT CHANGE UP (ref 3.3–5)
ALP SERPL-CCNC: 162 U/L — HIGH (ref 40–120)
ALT FLD-CCNC: 263 U/L — HIGH (ref 10–45)
ANION GAP SERPL CALC-SCNC: 16 MMOL/L — SIGNIFICANT CHANGE UP (ref 5–17)
AST SERPL-CCNC: 96 U/L — HIGH (ref 10–40)
BILIRUB SERPL-MCNC: 2 MG/DL — HIGH (ref 0.2–1.2)
BUN SERPL-MCNC: 14 MG/DL — SIGNIFICANT CHANGE UP (ref 7–23)
CALCIUM SERPL-MCNC: 9.9 MG/DL — SIGNIFICANT CHANGE UP (ref 8.4–10.5)
CHLORIDE SERPL-SCNC: 101 MMOL/L — SIGNIFICANT CHANGE UP (ref 96–108)
CO2 SERPL-SCNC: 22 MMOL/L — SIGNIFICANT CHANGE UP (ref 22–31)
CREAT SERPL-MCNC: 0.82 MG/DL — SIGNIFICANT CHANGE UP (ref 0.5–1.3)
EGFR: 85 ML/MIN/1.73M2 — SIGNIFICANT CHANGE UP
GLUCOSE SERPL-MCNC: 103 MG/DL — HIGH (ref 70–99)
HCG SERPL-ACNC: 3.3 MIU/ML — SIGNIFICANT CHANGE UP
HCT VFR BLD CALC: 41.6 % — SIGNIFICANT CHANGE UP (ref 34.5–45)
HGB BLD-MCNC: 12.8 G/DL — SIGNIFICANT CHANGE UP (ref 11.5–15.5)
MCHC RBC-ENTMCNC: 19.3 PG — LOW (ref 27–34)
MCHC RBC-ENTMCNC: 30.8 GM/DL — LOW (ref 32–36)
MCV RBC AUTO: 62.8 FL — LOW (ref 80–100)
NRBC # BLD: 0 /100 WBCS — SIGNIFICANT CHANGE UP (ref 0–0)
PLATELET # BLD AUTO: 261 K/UL — SIGNIFICANT CHANGE UP (ref 150–400)
POTASSIUM SERPL-MCNC: 4.2 MMOL/L — SIGNIFICANT CHANGE UP (ref 3.5–5.3)
POTASSIUM SERPL-SCNC: 4.2 MMOL/L — SIGNIFICANT CHANGE UP (ref 3.5–5.3)
PROT SERPL-MCNC: 8.3 G/DL — SIGNIFICANT CHANGE UP (ref 6–8.3)
RBC # BLD: 6.62 M/UL — HIGH (ref 3.8–5.2)
RBC # FLD: 17.5 % — HIGH (ref 10.3–14.5)
SODIUM SERPL-SCNC: 139 MMOL/L — SIGNIFICANT CHANGE UP (ref 135–145)
WBC # BLD: 7.59 K/UL — SIGNIFICANT CHANGE UP (ref 3.8–10.5)
WBC # FLD AUTO: 7.59 K/UL — SIGNIFICANT CHANGE UP (ref 3.8–10.5)

## 2024-09-04 PROCEDURE — 99233 SBSQ HOSP IP/OBS HIGH 50: CPT

## 2024-09-04 RX ORDER — ACETAMINOPHEN 325 MG/1
650 TABLET ORAL EVERY 6 HOURS
Refills: 0 | Status: DISCONTINUED | OUTPATIENT
Start: 2024-09-04 | End: 2024-09-05

## 2024-09-04 RX ADMIN — PIPERACILLIN SODIUM AND TAZOBACTAM SODIUM 25 GRAM(S): 3; .375 INJECTION, POWDER, FOR SOLUTION INTRAVENOUS at 14:00

## 2024-09-04 RX ADMIN — PIPERACILLIN SODIUM AND TAZOBACTAM SODIUM 25 GRAM(S): 3; .375 INJECTION, POWDER, FOR SOLUTION INTRAVENOUS at 21:53

## 2024-09-04 RX ADMIN — ACETAMINOPHEN 650 MILLIGRAM(S): 325 TABLET ORAL at 09:44

## 2024-09-04 RX ADMIN — EXEMESTANE 25 MILLIGRAM(S): 25 TABLET, FILM COATED ORAL at 05:17

## 2024-09-04 RX ADMIN — PIPERACILLIN SODIUM AND TAZOBACTAM SODIUM 25 GRAM(S): 3; .375 INJECTION, POWDER, FOR SOLUTION INTRAVENOUS at 05:18

## 2024-09-04 RX ADMIN — ACETAMINOPHEN 650 MILLIGRAM(S): 325 TABLET ORAL at 10:40

## 2024-09-04 NOTE — PRE PROCEDURE NOTE - PRE PROCEDURE EVALUATION
Preop Dx: acute cholecystitis   Surgeon: Vera  Procedure: laparoscopic cholecystomy, possible open     Vital Signs Last 24 Hrs  T(C): 36.3 (04 Sep 2024 09:08), Max: 36.5 (04 Sep 2024 05:23)  T(F): 97.4 (04 Sep 2024 09:08), Max: 97.7 (04 Sep 2024 05:23)  HR: 58 (04 Sep 2024 09:08) (58 - 60)  BP: 123/81 (04 Sep 2024 09:08) (115/76 - 123/81)  BP(mean): --  RR: 18 (04 Sep 2024 09:08) (18 - 18)  SpO2: 95% (04 Sep 2024 09:08) (95% - 97%)    Parameters below as of 04 Sep 2024 09:08  Patient On (Oxygen Delivery Method): room air                            12.8   7.59  )-----------( 261      ( 04 Sep 2024 10:47 )             41.6     -04    139  |  101  |  14  ----------------------------<  103<H>  4.2   |  22  |  0.82    Ca    9.9      04 Sep 2024 10:48  Phos  4.6     09-03  Mg     2.3     -03    TPro  8.3  /  Alb  4.4  /  TBili  2.0<H>  /  DBili  x   /  AST  96<H>  /  ALT  263<H>  /  AlkPhos  162<H>  09-04    PT/INR - ( 03 Sep 2024 09:41 )   PT: 11.1 sec;   INR: 1.01 ratio         PTT - ( 03 Sep 2024 09:41 )  PTT:31.3 sec  Daily     Daily Weight in k.2 (04 Sep 2024 09:08)    EKG:  CXR:   Type and Screen:         A/P: 54y Female     - OR 24 for above procedure   - NPO past midnight, except medications  - IVF while NPO  - Consent signed and in chart  - Medical clearance for OR in chart

## 2024-09-04 NOTE — PROGRESS NOTE ADULT - ASSESSMENT
54F with PMH of breast cancer s/p b/l mastectomy and chemotherapy (2019), currently on Exemestane, DVT/PE (stopped Eliquis 4 months ago) who initially presented with three days of abdominal pain prior to initial presentation. Initial concern for choledolithiasis/cholangitis 2/2 to imaging findings of dilated CBD 8mm and gallstones, however subsequent MRI negative.    Surgery consulted for possible lap hussein.    RECS:  - Tbili elevated today to 2.5 from 1.7  - Please trend LFTs daily  - Will consider semi-elective cholecystectomy when Tbili normalizes  - Remainder of care per primary

## 2024-09-05 LAB
ALBUMIN SERPL ELPH-MCNC: 4.4 G/DL — SIGNIFICANT CHANGE UP (ref 3.3–5)
ALP SERPL-CCNC: 145 U/L — HIGH (ref 40–120)
ALT FLD-CCNC: 216 U/L — HIGH (ref 10–45)
ANION GAP SERPL CALC-SCNC: 11 MMOL/L — SIGNIFICANT CHANGE UP (ref 5–17)
AST SERPL-CCNC: 105 U/L — HIGH (ref 10–40)
BILIRUB DIRECT SERPL-MCNC: 0.2 MG/DL — SIGNIFICANT CHANGE UP (ref 0–0.3)
BILIRUB INDIRECT FLD-MCNC: 1.9 MG/DL — HIGH (ref 0.2–1)
BILIRUB SERPL-MCNC: 2.1 MG/DL — HIGH (ref 0.2–1.2)
BLD GP AB SCN SERPL QL: NEGATIVE — SIGNIFICANT CHANGE UP
BUN SERPL-MCNC: 15 MG/DL — SIGNIFICANT CHANGE UP (ref 7–23)
CALCIUM SERPL-MCNC: 10.1 MG/DL — SIGNIFICANT CHANGE UP (ref 8.4–10.5)
CHLORIDE SERPL-SCNC: 100 MMOL/L — SIGNIFICANT CHANGE UP (ref 96–108)
CO2 SERPL-SCNC: 24 MMOL/L — SIGNIFICANT CHANGE UP (ref 22–31)
CREAT SERPL-MCNC: 0.85 MG/DL — SIGNIFICANT CHANGE UP (ref 0.5–1.3)
EGFR: 82 ML/MIN/1.73M2 — SIGNIFICANT CHANGE UP
GLUCOSE SERPL-MCNC: 83 MG/DL — SIGNIFICANT CHANGE UP (ref 70–99)
HCT VFR BLD CALC: 41.6 % — SIGNIFICANT CHANGE UP (ref 34.5–45)
HGB BLD-MCNC: 12.5 G/DL — SIGNIFICANT CHANGE UP (ref 11.5–15.5)
INR BLD: 1.06 RATIO — SIGNIFICANT CHANGE UP (ref 0.85–1.18)
MAGNESIUM SERPL-MCNC: 2.6 MG/DL — SIGNIFICANT CHANGE UP (ref 1.6–2.6)
MCHC RBC-ENTMCNC: 19.2 PG — LOW (ref 27–34)
MCHC RBC-ENTMCNC: 30 GM/DL — LOW (ref 32–36)
MCV RBC AUTO: 63.8 FL — LOW (ref 80–100)
NRBC # BLD: 0 /100 WBCS — SIGNIFICANT CHANGE UP (ref 0–0)
PHOSPHATE SERPL-MCNC: 5.8 MG/DL — HIGH (ref 2.5–4.5)
PLATELET # BLD AUTO: 305 K/UL — SIGNIFICANT CHANGE UP (ref 150–400)
POTASSIUM SERPL-MCNC: 3.9 MMOL/L — SIGNIFICANT CHANGE UP (ref 3.5–5.3)
POTASSIUM SERPL-MCNC: 6 MMOL/L — HIGH (ref 3.5–5.3)
POTASSIUM SERPL-SCNC: 3.9 MMOL/L — SIGNIFICANT CHANGE UP (ref 3.5–5.3)
POTASSIUM SERPL-SCNC: 6 MMOL/L — HIGH (ref 3.5–5.3)
PROT SERPL-MCNC: 8.5 G/DL — HIGH (ref 6–8.3)
PROTHROM AB SERPL-ACNC: 11.1 SEC — SIGNIFICANT CHANGE UP (ref 9.5–13)
RBC # BLD: 6.52 M/UL — HIGH (ref 3.8–5.2)
RBC # FLD: 17.6 % — HIGH (ref 10.3–14.5)
RH IG SCN BLD-IMP: POSITIVE — SIGNIFICANT CHANGE UP
SODIUM SERPL-SCNC: 135 MMOL/L — SIGNIFICANT CHANGE UP (ref 135–145)
WBC # BLD: 9.13 K/UL — SIGNIFICANT CHANGE UP (ref 3.8–10.5)
WBC # FLD AUTO: 9.13 K/UL — SIGNIFICANT CHANGE UP (ref 3.8–10.5)

## 2024-09-05 PROCEDURE — 88304 TISSUE EXAM BY PATHOLOGIST: CPT | Mod: 26

## 2024-09-05 PROCEDURE — 99233 SBSQ HOSP IP/OBS HIGH 50: CPT

## 2024-09-05 PROCEDURE — 47562 LAPAROSCOPIC CHOLECYSTECTOMY: CPT

## 2024-09-05 DEVICE — LIGATING CLIPS WECK HEMOCLIP TANTALUM MEDIUM (BLUE) 25: Type: IMPLANTABLE DEVICE | Status: FUNCTIONAL

## 2024-09-05 RX ORDER — OXYCODONE HYDROCHLORIDE 5 MG/1
5 TABLET ORAL EVERY 4 HOURS
Refills: 0 | Status: DISCONTINUED | OUTPATIENT
Start: 2024-09-05 | End: 2024-09-06

## 2024-09-05 RX ORDER — HYDROMORPHONE HYDROCHLORIDE 2 MG/1
0.5 TABLET ORAL
Refills: 0 | Status: DISCONTINUED | OUTPATIENT
Start: 2024-09-05 | End: 2024-09-05

## 2024-09-05 RX ORDER — ONDANSETRON 2 MG/ML
4 INJECTION, SOLUTION INTRAMUSCULAR; INTRAVENOUS EVERY 8 HOURS
Refills: 0 | Status: DISCONTINUED | OUTPATIENT
Start: 2024-09-05 | End: 2024-09-06

## 2024-09-05 RX ORDER — MAGNESIUM, ALUMINUM HYDROXIDE 200-225/5
30 SUSPENSION, ORAL (FINAL DOSE FORM) ORAL EVERY 4 HOURS
Refills: 0 | Status: DISCONTINUED | OUTPATIENT
Start: 2024-09-05 | End: 2024-09-06

## 2024-09-05 RX ORDER — OXYCODONE HYDROCHLORIDE 5 MG/1
2.5 TABLET ORAL EVERY 4 HOURS
Refills: 0 | Status: DISCONTINUED | OUTPATIENT
Start: 2024-09-05 | End: 2024-09-06

## 2024-09-05 RX ORDER — ACETAMINOPHEN 325 MG/1
650 TABLET ORAL EVERY 6 HOURS
Refills: 0 | Status: DISCONTINUED | OUTPATIENT
Start: 2024-09-05 | End: 2024-09-06

## 2024-09-05 RX ORDER — ONDANSETRON 2 MG/ML
4 INJECTION, SOLUTION INTRAMUSCULAR; INTRAVENOUS ONCE
Refills: 0 | Status: DISCONTINUED | OUTPATIENT
Start: 2024-09-05 | End: 2024-09-05

## 2024-09-05 RX ADMIN — PIPERACILLIN SODIUM AND TAZOBACTAM SODIUM 25 GRAM(S): 3; .375 INJECTION, POWDER, FOR SOLUTION INTRAVENOUS at 05:19

## 2024-09-05 RX ADMIN — ONDANSETRON 4 MILLIGRAM(S): 2 INJECTION, SOLUTION INTRAMUSCULAR; INTRAVENOUS at 19:52

## 2024-09-05 RX ADMIN — ACETAMINOPHEN 650 MILLIGRAM(S): 325 TABLET ORAL at 17:58

## 2024-09-05 RX ADMIN — HYDROMORPHONE HYDROCHLORIDE 0.5 MILLIGRAM(S): 2 TABLET ORAL at 13:27

## 2024-09-05 RX ADMIN — HYDROMORPHONE HYDROCHLORIDE 0.5 MILLIGRAM(S): 2 TABLET ORAL at 13:10

## 2024-09-05 RX ADMIN — ACETAMINOPHEN 650 MILLIGRAM(S): 325 TABLET ORAL at 18:04

## 2024-09-05 NOTE — PROGRESS NOTE ADULT - ASSESSMENT
ASSESSMENT:  54F with PMH of breast cancer s/p b/l mastectomy and chemotherapy (2019), currently on Exemestane, DVT/PE (stopped Eliquis 4 months ago) who initially presented with three days of abdominal pain prior to initial presentation. Initial concern for choledolithiasis/cholangitis 2/2 to imaging findings of dilated CBD 8mm and gallstones, however subsequent MRI negative. Down-trending LFTs and bilirubin.       RECS:  - NPO  - OR for lap hussein today   ASSESSMENT:  54F with PMH of breast cancer s/p b/l mastectomy and chemotherapy (2019), currently on Exemestane, DVT/PE (stopped Eliquis 4 months ago) who initially presented with three days of abdominal pain prior to initial presentation. Initial concern for choledolithiasis/cholangitis 2/2 to imaging findings of dilated CBD 8mm and gallstones, however subsequent MRI negative. Down-trending LFTs and bilirubin.       RECS:  - NPO  - OR for lap hussein today  - After cholecystectomy, patient is surgically complete  - Post-op, may start a regular diet; no further antibiotics indicated  - Appropriate for discharge from surgical standpoint after surgery      ACS  [id number]

## 2024-09-05 NOTE — PROGRESS NOTE ADULT - ATTENDING COMMENTS
- To OR today for laparoscopic cholecystectomy.  - Risk, benefit, and alternatives to surgery explained to patient.  - Consent in chart.

## 2024-09-05 NOTE — BRIEF OPERATIVE NOTE - OPERATION/FINDINGS
Gallbladder with stones and surrounding fibrosis, c/w chronic cholecystitis. Cystic duct and artery identified and clipped.

## 2024-09-05 NOTE — CHART NOTE - NSCHARTNOTEFT_GEN_A_CORE
POST-OPERATIVE NOTE    Subjective:  Patient is s/p lap hussein, recovering appropriately. Has some lower abdominal pain, has not received since pain medications since PACU. Has not yet tried eating yet. Has not yet voided. Denies flatus, BM. Has not yet gotten OOB.     Vital Signs Last 24 Hrs  T(C): 36.7 (05 Sep 2024 15:16), Max: 36.8 (04 Sep 2024 20:13)  T(F): 98.1 (05 Sep 2024 15:16), Max: 98.2 (04 Sep 2024 20:13)  HR: 72 (05 Sep 2024 15:16) (63 - 92)  BP: 126/81 (05 Sep 2024 15:16) (105/65 - 126/85)  BP(mean): 83 (05 Sep 2024 14:15) (80 - 92)  RR: 16 (05 Sep 2024 15:16) (14 - 18)  SpO2: 94% (05 Sep 2024 15:16) (92% - 96%)    Parameters below as of 05 Sep 2024 15:16  Patient On (Oxygen Delivery Method): room air      I&O's Detail    04 Sep 2024 07:01  -  05 Sep 2024 07:00  --------------------------------------------------------  IN:    IV PiggyBack: 300 mL    Oral Fluid: 960 mL  Total IN: 1260 mL    OUT:  Total OUT: 0 mL    Total NET: 1260 mL          PAST MEDICAL & SURGICAL HISTORY:  Breast cancer  H/O mastectomy      Physical Exam:  General: NAD, resting comfortably in bed  Pulmonary: Nonlabored breathing, no respiratory distress  Cardiovascular: NSR  Abdominal: soft, mild distension, RUQ tenderness to palpation, dressings cdi  Extremities: WWP      LABS:                        12.5   9.13  )-----------( 305      ( 05 Sep 2024 07:10 )             41.6     09-05    x   |  x   |  x   ----------------------------<  x   3.9   |  x   |  x     Ca    10.1      05 Sep 2024 07:12  Phos  5.8     09-05  Mg     2.6     09-05    TPro  8.5<H>  /  Alb  4.4  /  TBili  2.1<H>  /  DBili  0.2  /  AST  105<H>  /  ALT  216<H>  /  AlkPhos  145<H>  09-05    PT/INR - ( 05 Sep 2024 07:11 )   PT: 11.1 sec;   INR: 1.06 ratio           CAPILLARY BLOOD GLUCOSE:    Assessment:  54F with PMH of breast cancer s/p b/l mastectomy and chemotherapy (2019), currently on Exemestane, DVT/PE (stopped Eliquis 4 months ago) who initially presented with three days of abdominal pain prior to initial presentation. Initial concern for choledolithiasis/cholangitis 2/2 to imaging findings of dilated CBD 8mm and gallstones, however subsequent MRI negative. Down-trending LFTs and bilirubin. Now several hours post op lap hussein, recovering approriately    Plan:  - Regular diet  - multimodal pain control  - Appropriate for discharge from surgical standpoint after surgery  - remainder care per primary team    ACS  [id number]

## 2024-09-06 ENCOUNTER — TRANSCRIPTION ENCOUNTER (OUTPATIENT)
Age: 54
End: 2024-09-06

## 2024-09-06 VITALS
HEART RATE: 72 BPM | SYSTOLIC BLOOD PRESSURE: 114 MMHG | OXYGEN SATURATION: 93 % | TEMPERATURE: 98 F | RESPIRATION RATE: 18 BRPM | DIASTOLIC BLOOD PRESSURE: 74 MMHG

## 2024-09-06 PROCEDURE — C9399: CPT

## 2024-09-06 PROCEDURE — 83735 ASSAY OF MAGNESIUM: CPT

## 2024-09-06 PROCEDURE — 81001 URINALYSIS AUTO W/SCOPE: CPT

## 2024-09-06 PROCEDURE — 74181 MRI ABDOMEN W/O CONTRAST: CPT | Mod: MC

## 2024-09-06 PROCEDURE — 80048 BASIC METABOLIC PNL TOTAL CA: CPT

## 2024-09-06 PROCEDURE — 86901 BLOOD TYPING SEROLOGIC RH(D): CPT

## 2024-09-06 PROCEDURE — 80053 COMPREHEN METABOLIC PANEL: CPT

## 2024-09-06 PROCEDURE — 84484 ASSAY OF TROPONIN QUANT: CPT

## 2024-09-06 PROCEDURE — 84702 CHORIONIC GONADOTROPIN TEST: CPT

## 2024-09-06 PROCEDURE — 82803 BLOOD GASES ANY COMBINATION: CPT

## 2024-09-06 PROCEDURE — 85018 HEMOGLOBIN: CPT

## 2024-09-06 PROCEDURE — C1889: CPT

## 2024-09-06 PROCEDURE — 82947 ASSAY GLUCOSE BLOOD QUANT: CPT

## 2024-09-06 PROCEDURE — 99239 HOSP IP/OBS DSCHRG MGMT >30: CPT

## 2024-09-06 PROCEDURE — 82435 ASSAY OF BLOOD CHLORIDE: CPT

## 2024-09-06 PROCEDURE — 76705 ECHO EXAM OF ABDOMEN: CPT

## 2024-09-06 PROCEDURE — 83690 ASSAY OF LIPASE: CPT

## 2024-09-06 PROCEDURE — 86900 BLOOD TYPING SEROLOGIC ABO: CPT

## 2024-09-06 PROCEDURE — 80076 HEPATIC FUNCTION PANEL: CPT

## 2024-09-06 PROCEDURE — 85027 COMPLETE CBC AUTOMATED: CPT

## 2024-09-06 PROCEDURE — 87086 URINE CULTURE/COLONY COUNT: CPT

## 2024-09-06 PROCEDURE — 85025 COMPLETE CBC W/AUTO DIFF WBC: CPT

## 2024-09-06 PROCEDURE — 86850 RBC ANTIBODY SCREEN: CPT

## 2024-09-06 PROCEDURE — 88304 TISSUE EXAM BY PATHOLOGIST: CPT

## 2024-09-06 PROCEDURE — 74177 CT ABD & PELVIS W/CONTRAST: CPT | Mod: MC

## 2024-09-06 PROCEDURE — 85730 THROMBOPLASTIN TIME PARTIAL: CPT

## 2024-09-06 PROCEDURE — 83605 ASSAY OF LACTIC ACID: CPT

## 2024-09-06 PROCEDURE — 84100 ASSAY OF PHOSPHORUS: CPT

## 2024-09-06 PROCEDURE — 36415 COLL VENOUS BLD VENIPUNCTURE: CPT

## 2024-09-06 PROCEDURE — 85014 HEMATOCRIT: CPT

## 2024-09-06 PROCEDURE — 84295 ASSAY OF SERUM SODIUM: CPT

## 2024-09-06 PROCEDURE — 82330 ASSAY OF CALCIUM: CPT

## 2024-09-06 PROCEDURE — 85610 PROTHROMBIN TIME: CPT

## 2024-09-06 PROCEDURE — 84132 ASSAY OF SERUM POTASSIUM: CPT

## 2024-09-06 PROCEDURE — 99285 EMERGENCY DEPT VISIT HI MDM: CPT

## 2024-09-06 RX ORDER — OXYCODONE HYDROCHLORIDE 5 MG/1
1 TABLET ORAL
Qty: 12 | Refills: 0
Start: 2024-09-06 | End: 2024-09-08

## 2024-09-06 RX ORDER — ACETAMINOPHEN 325 MG/1
2 TABLET ORAL
Qty: 0 | Refills: 0 | DISCHARGE
Start: 2024-09-06

## 2024-09-06 RX ADMIN — ACETAMINOPHEN 650 MILLIGRAM(S): 325 TABLET ORAL at 12:13

## 2024-09-06 RX ADMIN — OXYCODONE HYDROCHLORIDE 2.5 MILLIGRAM(S): 5 TABLET ORAL at 08:40

## 2024-09-06 RX ADMIN — ACETAMINOPHEN 650 MILLIGRAM(S): 325 TABLET ORAL at 01:00

## 2024-09-06 RX ADMIN — ACETAMINOPHEN 650 MILLIGRAM(S): 325 TABLET ORAL at 11:40

## 2024-09-06 RX ADMIN — OXYCODONE HYDROCHLORIDE 2.5 MILLIGRAM(S): 5 TABLET ORAL at 07:58

## 2024-09-06 RX ADMIN — ACETAMINOPHEN 650 MILLIGRAM(S): 325 TABLET ORAL at 00:00

## 2024-09-06 NOTE — DISCHARGE NOTE PROVIDER - NSDCFUSCHEDAPPT_GEN_ALL_CORE_FT
[facility]  [facility] [address] [place] [address]   Scheduled Appointment: 09/25/2024

## 2024-09-06 NOTE — DISCHARGE NOTE PROVIDER - HOSPITAL COURSE
HPI:  54F PMHx h/o breast ca s/p b/l mastectomy, recent DVT/PE. Presenting w/ abd pain, nausea and diarrhea x1d, worse with eating.   In the ED, VSS.   CBC w/ wbc 14, hgb 11.7, plt 295.   First CMP showed K 5.7, SCr 0.58, Tbili 1.6, alk phos 159, , , lipase 46.   Pt received 1L IVF.   Repeat CMP showed K 4.3, SCr 0.59, Tbili 1.7, alk phos 165, , .   VBG w/ lactate 2.0   UA with trace LE, 12 wbc, mod bacteria, but contaminated w/ 8 epith cells.   CT A/p neg for acute path, but US ruq showed gallstones and  8mm dilated CBD, but no GB wall thickening.   Pt meets criteria for cholangitis  (02 Sep 2024 02:12)    Hospital Course:    54F with PMH of breast cancer s/p b/l mastectomy and chemotherapy (2019), currently on Exemestane, DVT/PE (stopped Eliquis 4 months ago) who initially presented with three days of abdominal pain prior to initial presentation. Initial concern for choledolithiasis/cholangitis 2/2 to imaging findings of dilated CBD 8mm and gallstones, however MRCP negative for choledocho, found to have gallstones s/p lap hussein     Problem/Plan - 1:  ·  Problem: Cholelithiases.   ·  Plan: MRCP negative for choledocholithiasis, +gallstones  D/w GI 9/3 no role for ERCP/endoscopic intervention   D/w sx 9/4, s/p cholecystectomy 9/5   Trend lfts  DC home today.     Problem/Plan - 2:  ·  Problem: History of breast cancer.   ·  Plan: C/w exemestane.      Important Medication Changes and Reason:  Oxycodone as needed for pain control     Active or Pending Issues Requiring Follow-up:    Advanced Directives:   [ x] Full code  [ ] DNR  [ ] Hospice    Discharge Diagnoses:  S/p Laproscopic Cholecystectomy   Cholelithiasis   Transminitis                    HPI:  54F PMHx h/o breast ca s/p b/l mastectomy, recent DVT/PE. Presenting w/ abd pain, nausea and diarrhea x1d, worse with eating.   In the ED, VSS.   CBC w/ wbc 14, hgb 11.7, plt 295.   First CMP showed K 5.7, SCr 0.58, Tbili 1.6, alk phos 159, , , lipase 46.   Pt received 1L IVF.   Repeat CMP showed K 4.3, SCr 0.59, Tbili 1.7, alk phos 165, , .   VBG w/ lactate 2.0   UA with trace LE, 12 wbc, mod bacteria, but contaminated w/ 8 epith cells.   CT A/p neg for acute path, but US ruq showed gallstones and  8mm dilated CBD, but no GB wall thickening.   Pt meets criteria for cholangitis  (02 Sep 2024 02:12)    Hospital Course:    54F with PMH of breast cancer s/p b/l mastectomy and chemotherapy (2019), currently on Exemestane, DVT/PE (stopped Eliquis 4 months ago) who initially presented with three days of abdominal pain prior to initial presentation. Initial concern for choledolithiasis/cholangitis 2/2 to imaging findings of dilated CBD 8mm and gallstones, however MRCP negative for choledocho, found to have gallstones s/p lap hussein     Problem/Plan - 1:  ·  Problem: Cholelithiases.   ·  Plan: MRCP negative for choledocholithiasis, +gallstones  D/w GI 9/3 no role for ERCP/endoscopic intervention   D/w sx 9/4, s/p cholecystectomy 9/5   Trend lfts  DC home today.     Problem/Plan - 2:  ·  Problem: History of breast cancer.   ·  Plan: C/w exemestane.      Important Medication Changes and Reason:  Oxycodone as needed for pain control     Active or Pending Issues Requiring Follow-up:    Advanced Directives:   [ x] Full code  [ ] DNR  [ ] Hospice    Discharge Diagnoses:  S/p Laproscopic Cholecystectomy   Cholelithiasis   Transminitis     Pt found to have acute cholecystitis, hyponatremia due to diarrhea, lactic acidosis

## 2024-09-06 NOTE — DISCHARGE NOTE PROVIDER - CARE PROVIDER_API CALL
KHLOE TOWNSEND  [address]  [place] [address]  Phone: [phone]  Fax: [phone]  Follow Up Time:     Antony Hayes  Surgery  [address] [place] [address], [address]  [place] [address]  Phone: [phone]  Fax: [phone]  Follow Up Time:

## 2024-09-06 NOTE — CHART NOTE - NSCHARTNOTEFT_GEN_A_CORE
Ms Indy Mendez was admitted to [facility] from 9/1-9/6/24. Pt is medically cleared to return to work 9/11/24.    Dr. Matilda Valdivia  [address] [address] [phone]

## 2024-09-06 NOTE — DISCHARGE NOTE PROVIDER - NSDCCPCAREPLAN_GEN_ALL_CORE_FT
PRINCIPAL DISCHARGE DIAGNOSIS  Diagnosis: Cholelithiases  Assessment and Plan of Treatment: Post cholecystectomy, continue pain meds     12-Feb-2021 05:41:54 PRINCIPAL DISCHARGE DIAGNOSIS  Diagnosis: Cholelithiases  Assessment and Plan of Treatment: Post cholecystectomy, continue pain meds.  Follow up with PCP in 1 week.      SECONDARY DISCHARGE DIAGNOSES  Diagnosis: History of breast cancer  Assessment and Plan of Treatment: Follow up with your Oncologist for monitoring and management.

## 2024-09-06 NOTE — PROGRESS NOTE ADULT - ASSESSMENT
54F with PMH of breast cancer s/p b/l mastectomy and chemotherapy (2019), currently on Exemestane, DVT/PE (stopped Eliquis 4 months ago) who initially presented with three days of abdominal pain prior to initial presentation. Initial concern for choledolithiasis/cholangitis 2/2 to imaging findings of dilated CBD 8mm and gallstones, however subsequent MRI negative. Down-trending LFTs and bilirubin. Lap hussein on 9/5, recovering well    Plan:  - Regular diet  - multimodal pain control  - Appropriate for discharge from surgical standpoint after surgery  - remainder care per primary team    ACS/Trauma  [id number].

## 2024-09-06 NOTE — DISCHARGE NOTE NURSING/CASE MANAGEMENT/SOCIAL WORK - NSDCPEFALRISK_GEN_ALL_CORE
For information on Fall & Injury Prevention, visit: [email or web address] OR  [email or web address] OR  https://www.cdc.gov/steadi/patient.html

## 2024-09-06 NOTE — PROGRESS NOTE ADULT - ASSESSMENT
54F with PMH of breast cancer s/p b/l mastectomy and chemotherapy (2019), currently on Exemestane, DVT/PE (stopped Eliquis 4 months ago) who initially presented with three days of abdominal pain prior to initial presentation. Initial concern for choledolithiasis/cholangitis 2/2 to imaging findings of dilated CBD 8mm and gallstones, however MRCP negative for choledocho, found to have gallstones s/p lap hussein

## 2024-09-06 NOTE — DISCHARGE NOTE PROVIDER - NSDCMRMEDTOKEN_GEN_ALL_CORE_FT
exemestane 25 mg tablet: TAKE ONE TABLET BY MOUTH EVERY OTHER DAY   acetaminophen 325 mg oral tablet: 2 tab(s) orally every 6 hours  exemestane 25 mg tablet: TAKE ONE TABLET BY MOUTH EVERY OTHER DAY  oxyCODONE 5 mg oral tablet: 1 tab(s) orally every 6 hours as needed for  severe pain MDD: 4

## 2024-09-06 NOTE — DISCHARGE NOTE PROVIDER - NSDCFUADDAPPT_GEN_ALL_CORE_FT
APPTS ARE READY TO BE MADE: [x ] YES    Best Family or Patient Contact (if needed): Patient.       APPTS ARE READY TO BE MADE: [x ] YES    Best Family or Patient Contact (if needed): Patient.      Prior to outreaching the patient, it was visible that the patient has secured a follow up appointment which was not scheduled by our team. Patient is scheduled with Dr. Adams 9/25/24 12:30pm [address] [facility]    Patient advised they did not want to proceed with scheduling appointments with the providers on their referrals. They will coordinate care on their own.

## 2024-09-06 NOTE — PROGRESS NOTE ADULT - SUBJECTIVE AND OBJECTIVE BOX
Patient is a 54y old  Female who presents with a chief complaint of     SUBJECTIVE / OVERNIGHT EVENTS: pt denies cp, sob, abd pain, chills     MEDICATIONS  (STANDING):  exemestane 25 milliGRAM(s) Oral <User Schedule>  piperacillin/tazobactam IVPB.. 3.375 Gram(s) IV Intermittent every 8 hours    MEDICATIONS  (PRN):  acetaminophen     Tablet .. 650 milliGRAM(s) Oral every 6 hours PRN Mild Pain (1 - 3)  aluminum hydroxide/magnesium hydroxide/simethicone Suspension 30 milliLiter(s) Oral every 4 hours PRN Dyspepsia  melatonin 3 milliGRAM(s) Oral at bedtime PRN Insomnia  ondansetron Injectable 4 milliGRAM(s) IV Push every 8 hours PRN Nausea and/or Vomiting        CAPILLARY BLOOD GLUCOSE        I&O's Summary    03 Sep 2024 07:01  -  04 Sep 2024 07:00  --------------------------------------------------------  IN: 840 mL / OUT: 0 mL / NET: 840 mL        PHYSICAL EXAM:  GENERAL: NAD, well-developed  HEAD:  Atraumatic, Normocephalic  EYES:  conjunctiva and sclera clear  NECK: Supple, No JVD  CHEST/LUNG: Clear to auscultation bilaterally; No wheeze  HEART: Regular rate and rhythm; No murmurs, rubs, or gallops  ABDOMEN: Soft, Nontender, Nondistended; Bowel sounds present  EXTREMITIES:  2+ Peripheral Pulses, No clubbing, cyanosis, or edema  PSYCH: AAOx3      LABS:                        12.8   7.59  )-----------( 261      ( 04 Sep 2024 10:47 )             41.6     09-04    139  |  101  |  14  ----------------------------<  103<H>  4.2   |  22  |  0.82    Ca    9.9      04 Sep 2024 10:48  Phos  4.6     09-03  Mg     2.3     09-03    TPro  8.3  /  Alb  4.4  /  TBili  2.0<H>  /  DBili  x   /  AST  96<H>  /  ALT  263<H>  /  AlkPhos  162<H>  09-04    PT/INR - ( 03 Sep 2024 09:41 )   PT: 11.1 sec;   INR: 1.01 ratio         PTT - ( 03 Sep 2024 09:41 )  PTT:31.3 sec      Urinalysis Basic - ( 04 Sep 2024 10:48 )    Color: x / Appearance: x / SG: x / pH: x  Gluc: 103 mg/dL / Ketone: x  / Bili: x / Urobili: x   Blood: x / Protein: x / Nitrite: x   Leuk Esterase: x / RBC: x / WBC x   Sq Epi: x / Non Sq Epi: x / Bacteria: x        RADIOLOGY & ADDITIONAL TESTS:    Imaging Personally Reviewed:    Consultant(s) Notes Reviewed:      Care Discussed with Consultants/Other Providers:  
Surgery Daily Progress Note      SUBJECTIVE:  Doing well.   No overnight events.   Denies abdominal pain.      OBJECTIVE:     ** VITAL SIGNS / I&O's **    T(C): 36.3 (09-04-24 @ 09:08), Max: 36.5 (09-04-24 @ 05:23)  T(F): 97.4 (09-04-24 @ 09:08), Max: 97.7 (09-04-24 @ 05:23)  HR: 58 (09-04-24 @ 09:08) (58 - 60)  BP: 123/81 (09-04-24 @ 09:08) (115/76 - 123/81)  RR: 18 (09-04-24 @ 09:08) (18 - 18)  SpO2: 95% (09-04-24 @ 09:08) (95% - 97%)      03 Sep 2024 07:01  -  04 Sep 2024 07:00  --------------------------------------------------------  IN:    IV PiggyBack: 200 mL    Oral Fluid: 640 mL  Total IN: 840 mL    OUT:  Total OUT: 0 mL    Total NET: 840 mL      ** PHYSICAL EXAM **    -- CONSTITUTIONAL: AOx3. NAD. no jaundice.  -- CARDIOVASCULAR: normotensive, regular rate   -- RESPIRATORY: breathing comfortably   -- ABDOMEN: soft, nontender, nondistended   -- EXTREMITIES: warm   -- NEUROLOGICAL: motor and sensation symmetric       ** LABS **     CBC (09-03 @ 09:41)                              12.1                           6.79    )----------------(  295        --    % Neutrophils, --    % Lymphocytes, ANC: --                                  39.3                  BMP (09-03 @ 09:41)             140     |  102     |  13    		Ca++ --      Ca 9.8                ---------------------------------( 99    		Mg 2.3                4.1     |  26      |  0.77  			Ph 4.6<H>    LFTs (09-03 @ 09:41)      TPro 7.9 / Alb 4.3 / TBili 2.5<H> / DBili -- / <H> / <H> / AlkPhos 175<H>    Coags (09-03 @ 09:41)  aPTT 31.3 / INR 1.01 / PT 11.1    -> Clean Catch Clean Catch (Midstream) Culture (09-01 @ 20:59)     NG    NG    <10,000 CFU/mL Normal Urogenital Deepa      **RADS**  MRCP:  LOWER CHEST: Clear.    LIVER: Subcentimeter hemangioma at the right hepatic dome. Diffuse   steatosis.  BILE DUCTS: Nondilated. No stones.  GALLBLADDER: Tiny layering stones. No thickening or inflammation.  SPLEEN: Normal.  PANCREAS: Normal.  ADRENALS: Normal.  KIDNEYS/URETERS: Symmetric size. No hydronephrosis.    VISUALIZED PORTIONS:  BOWEL: No bowel-related abnormality.  PERITONEUM: No ascites.  VESSELS: Normal caliber aorta.  RETROPERITONEUM/LYMPH NODES: No adenopathy.  ABDOMINAL WALL: Normal.  BONES: No aggressive lesion. L2 vertebral body hemangioma.    IMPRESSION:  *  Multiple tiny gallstones without biliary dilatation or   choledocholithiasis.  *  Diffuse fatty liver.  
SUBJECTIVE: Pt seen at bedside during AM rounds. No o/n events, patient resting comfortably.    Vital Signs Last 24 Hrs  T(C): 36.6 (06 Sep 2024 09:26), Max: 36.8 (06 Sep 2024 00:31)  T(F): 97.8 (06 Sep 2024 09:26), Max: 98.2 (06 Sep 2024 00:31)  HR: 72 (06 Sep 2024 09:26) (69 - 92)  BP: 114/74 (06 Sep 2024 09:26) (95/63 - 126/81)  BP(mean): 83 (05 Sep 2024 14:15) (80 - 92)  RR: 18 (06 Sep 2024 09:26) (14 - 18)  SpO2: 93% (06 Sep 2024 09:26) (92% - 96%)    Parameters below as of 06 Sep 2024 09:26  Patient On (Oxygen Delivery Method): room air        I&O's Summary    05 Sep 2024 07:01  -  06 Sep 2024 07:00  --------------------------------------------------------  IN: 480 mL / OUT: 0 mL / NET: 480 mL        LABS:                        12.5   9.13  )-----------( 305      ( 05 Sep 2024 07:10 )             41.6     09-05    x   |  x   |  x   ----------------------------<  x   3.9   |  x   |  x     Ca    10.1      05 Sep 2024 07:12  Phos  5.8     09-05  Mg     2.6     09-05    TPro  8.5<H>  /  Alb  4.4  /  TBili  2.1<H>  /  DBili  0.2  /  AST  105<H>  /  ALT  216<H>  /  AlkPhos  145<H>  09-05    PT/INR - ( 05 Sep 2024 07:11 )   PT: 11.1 sec;   INR: 1.06 ratio           Urinalysis Basic - ( 05 Sep 2024 07:12 )    Color: x / Appearance: x / SG: x / pH: x  Gluc: 83 mg/dL / Ketone: x  / Bili: x / Urobili: x   Blood: x / Protein: x / Nitrite: x   Leuk Esterase: x / RBC: x / WBC x   Sq Epi: x / Non Sq Epi: x / Bacteria: x        Physical Exam:  General Appearance: Appears well, NAD  Neck: Supple  Chest: Equal expansion bilaterally, equal breath sounds  CV: Pulse regular presently  Abdomen: Soft, nontender, appropriate incisional tenderness, dressings clean and dry and intact  Extremities: Grossly symmetric, SCD's in place       
Surgery Daily Progress Note      SUBJECTIVE:  Doing well.   No overnight events.   NPO in anticipation of OR today.      OBJECTIVE:     ** VITAL SIGNS / I&O's **    T(C): 36.5 (09-05-24 @ 05:00), Max: 36.8 (09-04-24 @ 20:13)  T(F): 97.7 (09-05-24 @ 05:00), Max: 98.2 (09-04-24 @ 20:13)  HR: 63 (09-05-24 @ 05:00) (58 - 68)  BP: 126/85 (09-05-24 @ 05:00) (109/73 - 126/85)  RR: 18 (09-05-24 @ 05:00) (18 - 18)  SpO2: 95% (09-05-24 @ 05:00) (94% - 95%)      04 Sep 2024 07:01  -  05 Sep 2024 07:00  --------------------------------------------------------  IN:    IV PiggyBack: 300 mL    Oral Fluid: 960 mL  Total IN: 1260 mL    OUT:  Total OUT: 0 mL    Total NET: 1260 mL      ** PHYSICAL EXAM **    -- CONSTITUTIONAL: AOx3. NAD.   -- CARDIOVASCULAR: normotensive, regular rate   -- RESPIRATORY: breathing comfortably   -- ABDOMEN: soft, nontender, nondistended   -- EXTREMITIES: warm  -- NEUROLOGICAL: motor and sensation symmetric       ** LABS **  CBC (09-04 @ 10:47)                              12.8                           7.59    )----------------(  261        --    % Neutrophils, --    % Lymphocytes, ANC: --                                  41.6                  BMP (09-04 @ 10:48)             139     |  101     |  14    		Ca++ --      Ca 9.9                ---------------------------------( 103<H>		Mg --                 4.2     |  22      |  0.82  			Ph --        LFTs (09-04 @ 10:48)      TPro 8.3 / Alb 4.4 / TBili 2.0<H> / DBili -- / AST 96<H> / <H> / AlkPhos 162<H>    -> Clean Catch Clean Catch (Midstream) Culture (09-01 @ 20:59)     NG    NG    <10,000 CFU/mL Normal Urogenital Deepa    MRCP:  LOWER CHEST: Clear.    LIVER: Subcentimeter hemangioma at the right hepatic dome. Diffuse   steatosis.  BILE DUCTS: Nondilated. No stones.  GALLBLADDER: Tiny layering stones. No thickening or inflammation.  SPLEEN: Normal.  PANCREAS: Normal.  ADRENALS: Normal.  KIDNEYS/URETERS: Symmetric size. No hydronephrosis.    VISUALIZED PORTIONS:  BOWEL: No bowel-related abnormality.  PERITONEUM: No ascites.  VESSELS: Normal caliber aorta.  RETROPERITONEUM/LYMPH NODES: No adenopathy.  ABDOMINAL WALL: Normal.  BONES: No aggressive lesion. L2 vertebral body hemangioma.    IMPRESSION:  *  Multiple tiny gallstones without biliary dilatation or   choledocholithiasis.  *  Diffuse fatty liver.                 CAPILLARY BLOOD GLUCOSE          **RADS**  
Patient is a 54y old  Female who presents with a chief complaint of     SUBJECTIVE / OVERNIGHT EVENTS: some pain and soreness at sx site, no cp, sob, tolerating po     MEDICATIONS  (STANDING):  acetaminophen     Tablet .. 650 milliGRAM(s) Oral every 6 hours    MEDICATIONS  (PRN):  aluminum hydroxide/magnesium hydroxide/simethicone Suspension 30 milliLiter(s) Oral every 4 hours PRN Dyspepsia  melatonin 3 milliGRAM(s) Oral at bedtime PRN Insomnia  ondansetron Injectable 4 milliGRAM(s) IV Push every 8 hours PRN Nausea and/or Vomiting  oxyCODONE    IR 2.5 milliGRAM(s) Oral every 4 hours PRN Moderate Pain (4 - 6)  oxyCODONE    IR 5 milliGRAM(s) Oral every 4 hours PRN Severe Pain (7 - 10)        CAPILLARY BLOOD GLUCOSE        I&O's Summary    05 Sep 2024 07:01  -  06 Sep 2024 07:00  --------------------------------------------------------  IN: 480 mL / OUT: 0 mL / NET: 480 mL        PHYSICAL EXAM:  GENERAL: NAD, well-developed  HEAD:  Atraumatic, Normocephalic  EYES: EOMI, PERRLA, conjunctiva and sclera clear  NECK: Supple, No JVD  CHEST/LUNG: Clear to auscultation bilaterally; No wheeze  HEART: Regular rate and rhythm; No murmurs, rubs, or gallops  ABDOMEN: Soft, +steri strips, +mild ttp right abdomen  Nondistended; Bowel sounds present  EXTREMITIES:  2+ Peripheral Pulses, No clubbing, cyanosis, or edema  PSYCH: AAOx3      LABS:                        12.5   9.13  )-----------( 305      ( 05 Sep 2024 07:10 )             41.6     09-05    x   |  x   |  x   ----------------------------<  x   3.9   |  x   |  x     Ca    10.1      05 Sep 2024 07:12  Phos  5.8     09-05  Mg     2.6     09-05    TPro  8.5<H>  /  Alb  4.4  /  TBili  2.1<H>  /  DBili  0.2  /  AST  105<H>  /  ALT  216<H>  /  AlkPhos  145<H>  09-05    PT/INR - ( 05 Sep 2024 07:11 )   PT: 11.1 sec;   INR: 1.06 ratio               Urinalysis Basic - ( 05 Sep 2024 07:12 )    Color: x / Appearance: x / SG: x / pH: x  Gluc: 83 mg/dL / Ketone: x  / Bili: x / Urobili: x   Blood: x / Protein: x / Nitrite: x   Leuk Esterase: x / RBC: x / WBC x   Sq Epi: x / Non Sq Epi: x / Bacteria: x        RADIOLOGY & ADDITIONAL TESTS:    Imaging Personally Reviewed:    Consultant(s) Notes Reviewed:      Care Discussed with Consultants/Other Providers:  
Patient is a 54y old  Female who presents with a chief complaint of     SUBJECTIVE / OVERNIGHT EVENTS: pt feels ol, denies cp, sob, abd pain is better, no n/v, feels hungry     MEDICATIONS  (STANDING):  exemestane 25 milliGRAM(s) Oral <User Schedule>  piperacillin/tazobactam IVPB.. 3.375 Gram(s) IV Intermittent every 8 hours    MEDICATIONS  (PRN):  aluminum hydroxide/magnesium hydroxide/simethicone Suspension 30 milliLiter(s) Oral every 4 hours PRN Dyspepsia  melatonin 3 milliGRAM(s) Oral at bedtime PRN Insomnia  ondansetron Injectable 4 milliGRAM(s) IV Push every 8 hours PRN Nausea and/or Vomiting        CAPILLARY BLOOD GLUCOSE        I&O's Summary    02 Sep 2024 07:01  -  03 Sep 2024 07:00  --------------------------------------------------------  IN: 450 mL / OUT: 0 mL / NET: 450 mL        PHYSICAL EXAM:  GENERAL: NAD, well-developed  HEAD:  Atraumatic, Normocephalic  EYES: EOMI, PERRLA, conjunctiva and sclera clear  NECK: Supple, No JVD  CHEST/LUNG: Clear to auscultation bilaterally; No wheeze  HEART: Regular rate and rhythm; No murmurs, rubs, or gallops  ABDOMEN: Soft, Nontender, Nondistended; Bowel sounds present  EXTREMITIES:  2+ Peripheral Pulses, No clubbing, cyanosis, or edema  PSYCH: AAOx3      LABS:                        12.1   6.79  )-----------( 295      ( 03 Sep 2024 09:41 )             39.3     09-03    140  |  102  |  13  ----------------------------<  99  4.1   |  26  |  0.77    Ca    9.8      03 Sep 2024 09:41  Phos  4.6     09-03  Mg     2.3     09-03    TPro  7.9  /  Alb  4.3  /  TBili  2.5<H>  /  DBili  x   /  AST  181<H>  /  ALT  364<H>  /  AlkPhos  175<H>  09-03    PT/INR - ( 03 Sep 2024 09:41 )   PT: 11.1 sec;   INR: 1.01 ratio         PTT - ( 03 Sep 2024 09:41 )  PTT:31.3 sec      Urinalysis Basic - ( 03 Sep 2024 09:41 )    Color: x / Appearance: x / SG: x / pH: x  Gluc: 99 mg/dL / Ketone: x  / Bili: x / Urobili: x   Blood: x / Protein: x / Nitrite: x   Leuk Esterase: x / RBC: x / WBC x   Sq Epi: x / Non Sq Epi: x / Bacteria: x        RADIOLOGY & ADDITIONAL TESTS:    Imaging Personally Reviewed:    Consultant(s) Notes Reviewed:      Care Discussed with Consultants/Other Providers:  
Patient is a 54y old  Female who presents with a chief complaint of     SUBJECTIVE / OVERNIGHT EVENTS: pt feels ok, no complaints     MEDICATIONS  (STANDING):  acetaminophen     Tablet .. 650 milliGRAM(s) Oral every 6 hours  lactated ringers. 1000 milliLiter(s) (75 mL/Hr) IV Continuous <Continuous>    MEDICATIONS  (PRN):  aluminum hydroxide/magnesium hydroxide/simethicone Suspension 30 milliLiter(s) Oral every 4 hours PRN Dyspepsia  HYDROmorphone  Injectable 0.5 milliGRAM(s) IV Push every 10 minutes PRN Severe Pain (7 - 10)  melatonin 3 milliGRAM(s) Oral at bedtime PRN Insomnia  ondansetron Injectable 4 milliGRAM(s) IV Push once PRN Nausea and/or Vomiting  ondansetron Injectable 4 milliGRAM(s) IV Push every 8 hours PRN Nausea and/or Vomiting  oxyCODONE    IR 2.5 milliGRAM(s) Oral every 4 hours PRN Moderate Pain (4 - 6)  oxyCODONE    IR 5 milliGRAM(s) Oral every 4 hours PRN Severe Pain (7 - 10)        CAPILLARY BLOOD GLUCOSE        I&O's Summary    04 Sep 2024 07:01  -  05 Sep 2024 07:00  --------------------------------------------------------  IN: 1260 mL / OUT: 0 mL / NET: 1260 mL        PHYSICAL EXAM:  GENERAL: NAD, well-developed  HEAD:  Atraumatic, Normocephalic  EYES: conjunctiva and sclera clear  NECK: Supple, No JVD  CHEST/LUNG: Clear to auscultation bilaterally; No wheeze  HEART: Regular rate and rhythm; No murmurs, rubs, or gallops  ABDOMEN: Soft, Nontender, Nondistended; Bowel sounds present  EXTREMITIES:  2+ Peripheral Pulses, No clubbing, cyanosis, or edema  PSYCH: AAOx3      LABS:                        12.5   9.13  )-----------( 305      ( 05 Sep 2024 07:10 )             41.6     09-05    x   |  x   |  x   ----------------------------<  x   3.9   |  x   |  x     Ca    10.1      05 Sep 2024 07:12  Phos  5.8     09-05  Mg     2.6     09-05    TPro  8.5<H>  /  Alb  4.4  /  TBili  2.1<H>  /  DBili  0.2  /  AST  105<H>  /  ALT  216<H>  /  AlkPhos  145<H>  09-05    PT/INR - ( 05 Sep 2024 07:11 )   PT: 11.1 sec;   INR: 1.06 ratio               Urinalysis Basic - ( 05 Sep 2024 07:12 )    Color: x / Appearance: x / SG: x / pH: x  Gluc: 83 mg/dL / Ketone: x  / Bili: x / Urobili: x   Blood: x / Protein: x / Nitrite: x   Leuk Esterase: x / RBC: x / WBC x   Sq Epi: x / Non Sq Epi: x / Bacteria: x        RADIOLOGY & ADDITIONAL TESTS:    Imaging Personally Reviewed:    Consultant(s) Notes Reviewed:      Care Discussed with Consultants/Other Providers:

## 2024-09-06 NOTE — DISCHARGE NOTE PROVIDER - CARE PROVIDERS DIRECT ADDRESSES
,[email or web address]

## 2024-09-06 NOTE — PROGRESS NOTE ADULT - PROBLEM SELECTOR PLAN 1
MRCP negative for choledocholithiasis, +gallstones  D/w GI 9/3 no role for ERCP/endoscopic intervention   D/w sx 9/4, plan for cholecystectomy today   Will continue IV zosyn for now   Trend lfts
MRCP negative for choledocholithiasis, +gallstones  D/w GI 9/3 no role for ERCP/endoscopic intervention   D/w sx 9/3, pending lfts/tbili in am to decide on cholecystectomy   Will continue IV zosyn for now   Clears diet   Trend lfts
MRCP negative for choledocholithiasis, +gallstones  D/w GI 9/3 no role for ERCP/endoscopic intervention   D/w sx 9/4, keep npo after midnight for possible sx tomorrow or friday    Will continue IV zosyn for now   Clears diet   Trend lfts
MRCP negative for choledocholithiasis, +gallstones  D/w GI 9/3 no role for ERCP/endoscopic intervention   D/w sx 9/4, s/p cholecystectomy 9/5   Trend lfts  DC home today

## 2024-09-10 PROBLEM — C50.919 MALIGNANT NEOPLASM OF UNSPECIFIED SITE OF UNSPECIFIED FEMALE BREAST: Chronic | Status: ACTIVE | Noted: 2024-09-02

## 2024-09-25 ENCOUNTER — APPOINTMENT (OUTPATIENT)
Dept: TRAUMA SURGERY | Facility: CLINIC | Age: 54
End: 2024-09-25

## 2024-09-25 PROBLEM — Z00.00 ENCOUNTER FOR PREVENTIVE HEALTH EXAMINATION: Status: ACTIVE | Noted: 2024-09-25

## 2024-09-25 NOTE — ASSESSMENT
[FreeTextEntry1] : patient is s/p laparoscopic cholecystectomy doing well with no complaints on exam the abdomen is soft non tender and not distended the incisions are well healed to follow up as needed

## 2025-03-19 ENCOUNTER — APPOINTMENT (OUTPATIENT)
Dept: OBGYN | Facility: CLINIC | Age: 55
End: 2025-03-19

## (undated) DEVICE — GOWN TRIMAX LG

## (undated) DEVICE — ELCTR CORD FOOTSWITCH 1PLR LAPSCP 10FT

## (undated) DEVICE — SHEARS COVIDIEN ENDO SHEAR 5MM X 31CM W UNIPOLAR CAUTERY

## (undated) DEVICE — DRAPE TOWEL BLUE 17" X 24"

## (undated) DEVICE — GLV 8 PROTEXIS (WHITE)

## (undated) DEVICE — SUT POLYSORB 0 36" GU-46

## (undated) DEVICE — GLV 7.5 PROTEXIS (WHITE)

## (undated) DEVICE — DRSG TELFA 3 X 8

## (undated) DEVICE — DRSG OPSITE 2.5 X 2"

## (undated) DEVICE — D HELP - CLEARVIEW CLEARIFY SYSTEM

## (undated) DEVICE — GLV 8.5 PROTEXIS (WHITE)

## (undated) DEVICE — DRSG STERISTRIPS 0.5 X 4"

## (undated) DEVICE — DISSECTOR ENDO PEANUT 5MM

## (undated) DEVICE — TUBING TRUWAVE PRESSURE MALE/FEMALE 72"

## (undated) DEVICE — SOL IRR POUR H2O 250ML

## (undated) DEVICE — POSITIONER FOAM EGG CRATE ULNAR 2PCS (PINK)

## (undated) DEVICE — STOPCOCK 4-WAY W SWIVEL MALE LUER LOCK NON VENTED RED CAP

## (undated) DEVICE — ELCTR BOVIE PENCIL SMOKE EVACUATION

## (undated) DEVICE — MARKING PEN W RULER

## (undated) DEVICE — TROCAR COVIDIEN VERSAPORT BLADELESS OPTICAL 5MM STANDARD

## (undated) DEVICE — SPECIMEN CONTAINER 100ML

## (undated) DEVICE — SOL IRR POUR NS 0.9% 500ML

## (undated) DEVICE — WARMING BLANKET UPPER ADULT

## (undated) DEVICE — SUT MONOCRYL 4-0 18" PS-2

## (undated) DEVICE — MEDICATION LABELS W MARKER

## (undated) DEVICE — BLADE SCALPEL SAFETYLOCK #11

## (undated) DEVICE — GLV 7 PROTEXIS (WHITE)

## (undated) DEVICE — GLV 6.5 PROTEXIS (WHITE)

## (undated) DEVICE — VENODYNE/SCD SLEEVE CALF MEDIUM

## (undated) DEVICE — DRAPE INSTRUMENT POUCH 6.75" X 11"

## (undated) DEVICE — DRAPE GENERAL ENDOSCOPY

## (undated) DEVICE — TUBING PLUME AWAY 4.0

## (undated) DEVICE — PACK LAP CHOLE LAP APPENDECTOMY

## (undated) DEVICE — TROCAR APPLIED MEDICAL KII BALLOON BLUNT TIP 12MM X 100MM

## (undated) DEVICE — TUBING STRYKEFLOW II SUCTION / IRRIGATOR

## (undated) DEVICE — PREP CHLORAPREP HI-LITE ORANGE 26ML

## (undated) DEVICE — ENDOCATCH 10MM SPECIMEN POUCH